# Patient Record
Sex: MALE | Race: WHITE | NOT HISPANIC OR LATINO | Employment: FULL TIME | URBAN - METROPOLITAN AREA
[De-identification: names, ages, dates, MRNs, and addresses within clinical notes are randomized per-mention and may not be internally consistent; named-entity substitution may affect disease eponyms.]

---

## 2017-03-21 ENCOUNTER — TELEPHONE (OUTPATIENT)
Dept: OTHER | Facility: CLINIC | Age: 57
End: 2017-03-21

## 2017-03-21 NOTE — TELEPHONE ENCOUNTER
3/21/2017    Call Regarding Onboarding [employer] CHOICES    Attempt 1    Message on voicemail     Comments:           Outreach   Aimee Bearden

## 2017-05-31 NOTE — TELEPHONE ENCOUNTER
5/31/2017    Call Regarding Onboarding [employer] Cho[employer]    Attempt 2    Message on voicemail     Comments:       Outreach   KV

## 2017-06-07 NOTE — TELEPHONE ENCOUNTER
6/7/2017    Call Regarding Onboarding [employer] Choices    Attempt 3    Message on voicemail     Comments: 0 dep      Outreach   CC

## 2017-07-13 ENCOUNTER — MYC MEDICAL ADVICE (OUTPATIENT)
Dept: INTERNAL MEDICINE | Facility: CLINIC | Age: 57
End: 2017-07-13

## 2017-08-02 DIAGNOSIS — K21.9 ESOPHAGEAL REFLUX: ICD-10-CM

## 2017-08-02 DIAGNOSIS — F41.9 ANXIETY: ICD-10-CM

## 2017-08-02 RX ORDER — PAROXETINE 20 MG/1
TABLET, FILM COATED ORAL
Qty: 30 TABLET | Refills: 0 | Status: SHIPPED | OUTPATIENT
Start: 2017-08-02 | End: 2017-08-17

## 2017-08-17 ENCOUNTER — OFFICE VISIT (OUTPATIENT)
Dept: INTERNAL MEDICINE | Facility: CLINIC | Age: 57
End: 2017-08-17
Payer: COMMERCIAL

## 2017-08-17 VITALS
DIASTOLIC BLOOD PRESSURE: 75 MMHG | OXYGEN SATURATION: 99 % | SYSTOLIC BLOOD PRESSURE: 118 MMHG | TEMPERATURE: 98.2 F | HEART RATE: 67 BPM | WEIGHT: 182 LBS | HEIGHT: 70 IN | BODY MASS INDEX: 26.05 KG/M2

## 2017-08-17 DIAGNOSIS — K21.9 GASTROESOPHAGEAL REFLUX DISEASE, ESOPHAGITIS PRESENCE NOT SPECIFIED: ICD-10-CM

## 2017-08-17 DIAGNOSIS — Z23 NEED FOR TDAP VACCINATION: ICD-10-CM

## 2017-08-17 DIAGNOSIS — Z00.01 ENCOUNTER FOR ROUTINE ADULT MEDICAL EXAM WITH ABNORMAL FINDINGS: Primary | ICD-10-CM

## 2017-08-17 DIAGNOSIS — Z12.5 SCREENING FOR PROSTATE CANCER: ICD-10-CM

## 2017-08-17 DIAGNOSIS — F41.9 ANXIETY: ICD-10-CM

## 2017-08-17 LAB
ALBUMIN SERPL-MCNC: 4.2 G/DL (ref 3.4–5)
ALP SERPL-CCNC: 43 U/L (ref 40–150)
ALT SERPL W P-5'-P-CCNC: 32 U/L (ref 0–70)
ANION GAP SERPL CALCULATED.3IONS-SCNC: 7 MMOL/L (ref 3–14)
AST SERPL W P-5'-P-CCNC: 19 U/L (ref 0–45)
BILIRUB SERPL-MCNC: 2.1 MG/DL (ref 0.2–1.3)
BUN SERPL-MCNC: 13 MG/DL (ref 7–30)
CALCIUM SERPL-MCNC: 8.9 MG/DL (ref 8.5–10.1)
CHLORIDE SERPL-SCNC: 103 MMOL/L (ref 94–109)
CHOLEST SERPL-MCNC: 128 MG/DL
CO2 SERPL-SCNC: 28 MMOL/L (ref 20–32)
CREAT SERPL-MCNC: 1.09 MG/DL (ref 0.66–1.25)
ERYTHROCYTE [DISTWIDTH] IN BLOOD BY AUTOMATED COUNT: 12.8 % (ref 10–15)
GFR SERPL CREATININE-BSD FRML MDRD: 69 ML/MIN/1.7M2
GLUCOSE SERPL-MCNC: 99 MG/DL (ref 70–99)
HCT VFR BLD AUTO: 47.2 % (ref 40–53)
HDLC SERPL-MCNC: 70 MG/DL
HGB BLD-MCNC: 16.3 G/DL (ref 13.3–17.7)
LDLC SERPL CALC-MCNC: 49 MG/DL
MCH RBC QN AUTO: 32.8 PG (ref 26.5–33)
MCHC RBC AUTO-ENTMCNC: 34.5 G/DL (ref 31.5–36.5)
MCV RBC AUTO: 95 FL (ref 78–100)
NONHDLC SERPL-MCNC: 58 MG/DL
PLATELET # BLD AUTO: 300 10E9/L (ref 150–450)
POTASSIUM SERPL-SCNC: 4.3 MMOL/L (ref 3.4–5.3)
PROT SERPL-MCNC: 6.9 G/DL (ref 6.8–8.8)
PSA SERPL-ACNC: 0.91 UG/L (ref 0–4)
RBC # BLD AUTO: 4.97 10E12/L (ref 4.4–5.9)
SODIUM SERPL-SCNC: 138 MMOL/L (ref 133–144)
TRIGL SERPL-MCNC: 43 MG/DL
WBC # BLD AUTO: 5.6 10E9/L (ref 4–11)

## 2017-08-17 PROCEDURE — 36415 COLL VENOUS BLD VENIPUNCTURE: CPT | Performed by: INTERNAL MEDICINE

## 2017-08-17 PROCEDURE — 90471 IMMUNIZATION ADMIN: CPT | Performed by: INTERNAL MEDICINE

## 2017-08-17 PROCEDURE — 99396 PREV VISIT EST AGE 40-64: CPT | Mod: 25 | Performed by: INTERNAL MEDICINE

## 2017-08-17 PROCEDURE — G0103 PSA SCREENING: HCPCS | Performed by: INTERNAL MEDICINE

## 2017-08-17 PROCEDURE — 85027 COMPLETE CBC AUTOMATED: CPT | Performed by: INTERNAL MEDICINE

## 2017-08-17 PROCEDURE — 90715 TDAP VACCINE 7 YRS/> IM: CPT | Performed by: INTERNAL MEDICINE

## 2017-08-17 PROCEDURE — 80053 COMPREHEN METABOLIC PANEL: CPT | Performed by: INTERNAL MEDICINE

## 2017-08-17 PROCEDURE — 80061 LIPID PANEL: CPT | Performed by: INTERNAL MEDICINE

## 2017-08-17 RX ORDER — PAROXETINE 20 MG/1
TABLET, FILM COATED ORAL
Qty: 90 TABLET | Refills: 3 | Status: SHIPPED | OUTPATIENT
Start: 2017-08-17 | End: 2018-10-02

## 2017-08-17 ASSESSMENT — PATIENT HEALTH QUESTIONNAIRE - PHQ9: 5. POOR APPETITE OR OVEREATING: NOT AT ALL

## 2017-08-17 ASSESSMENT — ANXIETY QUESTIONNAIRES
1. FEELING NERVOUS, ANXIOUS, OR ON EDGE: NOT AT ALL
3. WORRYING TOO MUCH ABOUT DIFFERENT THINGS: NOT AT ALL
IF YOU CHECKED OFF ANY PROBLEMS ON THIS QUESTIONNAIRE, HOW DIFFICULT HAVE THESE PROBLEMS MADE IT FOR YOU TO DO YOUR WORK, TAKE CARE OF THINGS AT HOME, OR GET ALONG WITH OTHER PEOPLE: NOT DIFFICULT AT ALL
7. FEELING AFRAID AS IF SOMETHING AWFUL MIGHT HAPPEN: NOT AT ALL
5. BEING SO RESTLESS THAT IT IS HARD TO SIT STILL: MORE THAN HALF THE DAYS
2. NOT BEING ABLE TO STOP OR CONTROL WORRYING: NOT AT ALL
GAD7 TOTAL SCORE: 2
6. BECOMING EASILY ANNOYED OR IRRITABLE: NOT AT ALL

## 2017-08-17 NOTE — NURSING NOTE
Screening Questionnaire for Adult Immunization    Are you sick today?   No   Do you have allergies to medications, food, a vaccine component or latex?   No   Have you ever had a serious reaction after receiving a vaccination?   No   Do you have a long-term health problem with heart disease, lung disease, asthma, kidney disease, metabolic disease (e.g. diabetes), anemia, or other blood disorder?   No   Do you have cancer, leukemia, HIV/AIDS, or any other immune system problem?   No   In the past 3 months, have you taken medications that affect  your immune system, such as prednisone, other steroids, or anticancer drugs; drugs for the treatment of rheumatoid arthritis, Crohn s disease, or psoriasis; or have you had radiation treatments?   No   Have you had a seizure, or a brain or other nervous system problem?   No   During the past year, have you received a transfusion of blood or blood     products, or been given immune (gamma) globulin or antiviral drug?   No   For women: Are you pregnant or is there a chance you could become        pregnant during the next month?   No   Have you received any vaccinations in the past 4 weeks?   No     Immunization questionnaire answers were all negative.        Per orders of Dr. Boykin, injection of Tdap given by Kelly Machado. Patient instructed to remain in clinic for 15 minutes afterwards, and to report any adverse reaction to me immediately.       Screening performed by Kelly Machado on 8/17/2017 at 2:57 PM.

## 2017-08-17 NOTE — NURSING NOTE
"Chief Complaint   Patient presents with     Physical       Initial /75  Pulse 67  Temp 98.2  F (36.8  C) (Oral)  Ht 5' 10\" (1.778 m)  Wt 182 lb (82.6 kg)  SpO2 99%  BMI 26.11 kg/m2 Estimated body mass index is 26.11 kg/(m^2) as calculated from the following:    Height as of this encounter: 5' 10\" (1.778 m).    Weight as of this encounter: 182 lb (82.6 kg).  Medication Reconciliation: complete  "

## 2017-08-17 NOTE — MR AVS SNAPSHOT
After Visit Summary   8/17/2017    Luis F Gipson    MRN: [id number]           Patient Information     Date Of Birth          1960        Visit Information        Provider Department      8/17/2017 1:00 PM Ronald Boykin MD [facility]        Today's Diagnoses     Encounter for routine adult medical exam with abnormal findings    -  1    Need for Tdap vaccination        Gastroesophageal reflux disease, esophagitis presence not specified        Anxiety        Screening for prostate cancer          Care Instructions      Preventive Health Recommendations  Male Ages 50 - 64    Yearly exam:             See your health care provider every year in order to  o   Review health changes.   o   Discuss preventive care.    o   Review your medicines if your doctor has prescribed any.     Have a cholesterol test every 5 years, or more frequently if you are at risk for high cholesterol/heart disease.     Have a diabetes test (fasting glucose) every three years. If you are at risk for diabetes, you should have this test more often.     Have a colonoscopy at age 50, or have a yearly FIT test (stool test). These exams will check for colon cancer.      Talk with your health care provider about whether or not a prostate cancer screening test (PSA) is right for you.    You should be tested each year for STDs (sexually transmitted diseases), if you re at risk.     Shots: Get a flu shot each year. Get a tetanus shot every 10 years.     Nutrition:    Eat at least 5 servings of fruits and vegetables daily.     Eat whole-grain bread, whole-wheat pasta and brown rice instead of white grains and rice.     Talk to your provider about Calcium and Vitamin D.     Lifestyle    Exercise for at least 150 minutes a week (30 minutes a day, 5 days a week). This will help you control your weight and prevent disease.     Limit alcohol to one drink per day.     No smoking.     Wear sunscreen to prevent skin  "cancer.     See your dentist every six months for an exam and cleaning.     See your eye doctor every 1 to 2 years.            Follow-ups after your visit        Who to contact     If you have questions or need follow up information about today's clinic visit or your schedule please contact [facility] directly at [phone].  Normal or non-critical lab and imaging results will be communicated to you by MyChart, letter or phone within 4 business days after the clinic has received the results. If you do not hear from us within 7 days, please contact the clinic through [employer] or phone. If you have a critical or abnormal lab result, we will notify you by phone as soon as possible.  Submit refill requests through [employer] or call your pharmacy and they will forward the refill request to us. Please allow 3 business days for your refill to be completed.          Additional Information About Your Visit        [employer] Information     [employer] gives you secure access to your electronic health record. If you see a primary care provider, you can also send messages to your care team and make appointments. If you have questions, please call your primary care clinic.  If you do not have a primary care provider, please call [phone] and they will assist you.        Care EveryWhere ID     This is your Care EveryWhere ID. This could be used by other organizations to access your [place] medical records  [id number]        Your Vitals Were     Pulse Temperature Height Pulse Oximetry BMI (Body Mass Index)       67 98.2  F (36.8  C) (Oral) 5' 10\" (1.778 m) 99% 26.11 kg/m2        Blood Pressure from Last 3 Encounters:   08/17/17 118/75   08/12/16 130/60   08/11/15 112/76    Weight from Last 3 Encounters:   08/17/17 182 lb (82.6 kg)   08/12/16 184 lb 12.8 oz (83.8 kg)   08/11/15 182 lb (82.6 kg)              We Performed the Following     CBC with platelets     Comprehensive metabolic panel     Lipid panel " reflex to direct LDL     Prostate spec antigen screen     TDAP VACCINE (ADACEL)          Today's Medication Changes          These changes are accurate as of: 8/17/17  9:45 PM.  If you have any questions, ask your nurse or doctor.               These medicines have changed or have updated prescriptions.        Dose/Directions    omeprazole 20 MG CR capsule   Commonly known as:  priLOSEC   This may have changed:  See the new instructions.   Used for:  Gastroesophageal reflux disease, esophagitis presence not specified   Changed by:  Ronald Boykin MD        TAKE 1 CAPSULE (20 MG) BY MOUTH DAILY   Quantity:  90 capsule   Refills:  3       PARoxetine 20 MG tablet   Commonly known as:  PAXIL   This may have changed:  See the new instructions.   Used for:  Anxiety   Changed by:  Ronald Boykin MD        TAKE 1 TABLET (20 MG) BY MOUTH AT BEDTIME   Quantity:  90 tablet   Refills:  3         Stop taking these medicines if you haven't already. Please contact your care team if you have questions.     LORazepam 0.5 MG tablet   Commonly known as:  ATIVAN   Stopped by:  Ronald Boykin MD                Where to get your medicines      These medications were sent to [facility] [id number] [place] [facility] [address]  [address] [place] [address]     Phone:  [phone]     omeprazole 20 MG CR capsule    PARoxetine 20 MG tablet                Primary Care Provider Office Phone # Fax #    Ronald Boykin [phone]       [address] [facility] [address]        Equal Access to Services     [facility] Hospital[facility]NA[facility]EL AH: Hadii jelly price hadasho Soeyalali, waaxda luqadaha, qaybta kaalmada adeegyada, anitha ozuna. So [facility] [phone].    ATENCIÓN: Si habla español, tiene a vu disposición servicios gratuitos de asistencia lingüística. Llame al [phone].    We comply with applicable federal civil rights laws and [place] laws. We do not discriminate on the basis of race, color, national  origin, age, disability sex, sexual orientation or gender identity.            Thank you!     Thank you for choosing [facility]  for your care. Our goal is always to provide you with excellent care. Hearing back from our patients is one way we can continue to improve our services. Please take a few minutes to complete the written survey that you may receive in the mail after your visit with us. Thank you!             Your Updated Medication List - Protect others around you: Learn how to safely use, store and throw away your medicines at www.disposemymeds.org.          This list is accurate as of: 8/17/17  9:45 PM.  Always use your most recent med list.                   Brand Name Dispense Instructions for use Diagnosis    omeprazole 20 MG CR capsule    priLOSEC    90 capsule    TAKE 1 CAPSULE (20 MG) BY MOUTH DAILY    Gastroesophageal reflux disease, esophagitis presence not specified       PARoxetine 20 MG tablet    PAXIL    90 tablet    TAKE 1 TABLET (20 MG) BY MOUTH AT BEDTIME    Anxiety

## 2017-08-17 NOTE — PROGRESS NOTES
SUBJECTIVE:   CC: Luis F Gipson is an 57 year old male who presents for preventative health visit.     Healthy Habits:    Do you get at least three servings of calcium containing foods daily (dairy, green leafy vegetables, etc.)? yes    Amount of exercise or daily activities, outside of work: 5 day(s) per week    Problems taking medications regularly No    Medication side effects: No    Have you had an eye exam in the past two years? no    Do you see a dentist twice per year? no    Do you have sleep apnea, excessive snoring or daytime drowsiness?no              Today's PHQ-2 Score: PHQ-2 ( 1999 Pfizer) 8/17/2017 8/12/2016   Q1: Little interest or pleasure in doing things 0 0   Q2: Feeling down, depressed or hopeless 0 0   PHQ-2 Score 0 0         Abuse: Current or Past(Physical, Sexual or Emotional)- No  Do you feel safe in your environment - Yes  Social History   Substance Use Topics     Smoking status: Former Smoker     Types: Cigarettes     Quit date: 3/27/2009     Smokeless tobacco: Never Used      Comment: 2-3 daily     Alcohol use Yes      Comment: couple drinks 4 days/wekk     The patient does not drink >3 drinks per day nor >7 drinks per week.    Last PSA:   PSA   Date Value Ref Range Status   08/11/2015 1.18 0 - 4 ug/L Final       Reviewed orders with patient. Reviewed health maintenance and updated orders accordingly - Yes  Labs reviewed in EPIC    Reviewed and updated as needed this visit by clinical staff  Tobacco  Allergies         Reviewed and updated as needed this visit by Provider            ROS:  C: NEGATIVE for fever, chills, change in weight  I: NEGATIVE for worrisome rashes, moles or lesions  E: NEGATIVE for vision changes or irritation. Due  For  Eye exam  ENT: NEGATIVE for ear, mouth and throat problems  R: NEGATIVE for significant cough or SOB  CV: NEGATIVE for chest pain, palpitations or peripheral edema  GI: NEGATIVE for nausea, abdominal pain, heartburn (with PPI. Has flares off  "of med), or change in bowel habits   male: negative for dysuria, hematuria, decreased urinary stream, erectile dysfunction, urethral discharge  M: NEGATIVE for significant arthralgias or myalgia  N: NEGATIVE for weakness, dizziness or paresthesias  P: NEGATIVE for changes in mood or affect with meds. ANGELICA = 2 with Paxil    OBJECTIVE:   /75  Pulse 67  Temp 98.2  F (36.8  C) (Oral)  Ht 5' 10\" (1.778 m)  Wt 182 lb (82.6 kg)  SpO2 99%  BMI 26.11 kg/m2  EXAM:  General appearance - healthy, alert, no distress. Normal affect  Skin - No rashes or lesions.  Head - normocephalic, atraumatic  Eyes - VIVEK, EOMI, fundi exam with nondilated pupils negative.  Ears - External ears normal. Canals clear. TM's normal.  Nose/Sinuses - Nares normal. Septum midline. Mucosa normal. No drainage or sinus tenderness.  Oropharynx - No erythema, no adenopathy, no exudates.  Neck - Supple without adenopathy or thyromegaly. No bruits.  Lungs - Clear to auscultation without wheezes/rhonchi.  Heart - Regular rate and rhythm without murmurs, clicks, or gallops.  Nodes - No supraclavicular, axillary, or inguinal adenopathy palpable.  Abdomen - Abdomen soft, non-tender. BS normal. No masses or hepatosplenomegaly palpable. No bruits.  Extremities -No cyanosis, clubbing or edema.    Musculoskeletal - Spine ROM normal. Muscular strength intact.   Peripheral pulses - radial=4/4, femoral=4/4, posterior tibial=4/4, dorsalis pedis=4/4,  Neuro - Gait normal. Reflexes normal and symmetric. Sensation grossly WNL.  Genital - Normal-appearing male external genitalia. No scrotal masses or inguinal hernia palpable.   Rectal - Guaic negative stool. Normal tone. Prostate normal in size to palpation. No rectal masses or prostate nodularity palpable      ASSESSMENT/PLAN:   1. Encounter for routine adult medical exam with abnormal findings  Counselled re: diet/exercise, stress reduction techniques  - Comprehensive metabolic panel  - Lipid panel reflex to " "direct LDL  - CBC with platelets  - Prostate spec antigen screen    2. Need for Tdap vaccination  - TDAP VACCINE (ADACEL)    3. Gastroesophageal reflux disease, esophagitis presence not specified  Controlled. Has flares off of med  - omeprazole (PRILOSEC) 20 MG CR capsule; TAKE 1 CAPSULE (20 MG) BY MOUTH DAILY  Dispense: 90 capsule; Refill: 3    4. Anxiety  controlled  - PARoxetine (PAXIL) 20 MG tablet; TAKE 1 TABLET (20 MG) BY MOUTH AT BEDTIME  Dispense: 90 tablet; Refill: 3    5. Screening for prostate cancer   PSA ordered for intermittent monitoring after discussion with pt and pt preference. Not doing annual PSA per USPTF recs  - Prostate spec antigen screen      COUNSELING:  Reviewed preventive health counseling, as reflected in patient instructions         reports that he quit smoking about 8 years ago. His smoking use included Cigarettes. He has never used smokeless tobacco.      Estimated body mass index is 26.11 kg/(m^2) as calculated from the following:    Height as of this encounter: 5' 10\" (1.778 m).    Weight as of this encounter: 182 lb (82.6 kg).   Weight management plan: Discussed healthy diet and exercise guidelines and patient will follow up in 12 months in clinic to re-evaluate.    Counseling Resources:  ATP IV Guidelines  Pooled Cohorts Equation Calculator  FRAX Risk Assessment  ICSI Preventive Guidelines  Dietary Guidelines for Americans, 2010  USDA's MyPlate  ASA Prophylaxis  Lung CA Screening    Ronald Boykin MD  [facility]    "

## 2017-08-18 ASSESSMENT — ANXIETY QUESTIONNAIRES: GAD7 TOTAL SCORE: 2

## 2017-09-03 DIAGNOSIS — F41.9 ANXIETY: ICD-10-CM

## 2017-09-03 NOTE — TELEPHONE ENCOUNTER
PARoxetine (PAXIL) 20 MG tablet     Last Written Prescription Date: 8/17/17  Last Fill Quantity: 90, # refills: 3  Last Office Visit with [facility] primary care provider:  8/17/17        Last PHQ-9 score on record= No flowsheet data found.

## 2017-09-06 RX ORDER — PAROXETINE 20 MG/1
TABLET, FILM COATED ORAL
Qty: 30 TABLET | Refills: 10 | Status: SHIPPED | OUTPATIENT
Start: 2017-09-06 | End: 2018-10-02 | Stop reason: DRUGHIGH

## 2017-12-18 ENCOUNTER — MYC MEDICAL ADVICE (OUTPATIENT)
Dept: INTERNAL MEDICINE | Facility: CLINIC | Age: 57
End: 2017-12-18

## 2018-02-05 ENCOUNTER — MYC MEDICAL ADVICE (OUTPATIENT)
Dept: INTERNAL MEDICINE | Facility: CLINIC | Age: 58
End: 2018-02-05

## 2018-08-07 DIAGNOSIS — K21.9 GASTROESOPHAGEAL REFLUX DISEASE, ESOPHAGITIS PRESENCE NOT SPECIFIED: ICD-10-CM

## 2018-08-07 NOTE — TELEPHONE ENCOUNTER
"Requested Prescriptions   Pending Prescriptions Disp Refills     omeprazole (PRILOSEC) 20 MG CR capsule [Pharmacy Med Name: OMEPRAZOLE DR 20 MG CAPSULE] 90 capsule 3     Sig: TAKE 1 CAPSULE (20 MG) BY MOUTH DAILY    PPI Protocol Passed    8/7/2018  1:49 AM       Passed - Not on Clopidogrel (unless Pantoprazole ordered)       Passed - No diagnosis of osteoporosis on record       Passed - Recent (12 mo) or future (30 days) visit within the authorizing provider's specialty    Patient had office visit in the last 12 months or has a visit in the next 30 days with authorizing provider or within the authorizing provider's specialty.  See \"Patient Info\" tab in inbasket, or \"Choose Columns\" in Meds & Orders section of the refill encounter.           Passed - Patient is age 18 or older        Last Written Prescription Date:  8/17/2017  Last Fill Quantity: 90,  # refills: 3   Last office visit: 8/17/2017 with prescribing provider:  8/17/2017   Future Office Visit:       "

## 2018-08-07 NOTE — LETTER
[facility]  [address] [id number] [phone]            Luis F Gipson  [address] [place], [place] [address] [address]        August 7, 2018    Dear Luis F,    While refilling your prescription today, we noticed that you are due for an appointment with your provider.  We will refill your prescription for 30 days, but a follow-up appointment must be made before any additional refills can be approved.     Taking care of your health is important to us and we look forward to seeing you in the near future.  Please call us at [phone] or [phone] (or use [employer]) to schedule an appointment.     Please disregard this notice if you have already made an appointment.    Sincerely,        [facility]

## 2018-09-07 DIAGNOSIS — K21.9 GASTROESOPHAGEAL REFLUX DISEASE, ESOPHAGITIS PRESENCE NOT SPECIFIED: ICD-10-CM

## 2018-09-07 NOTE — TELEPHONE ENCOUNTER
"Requested Prescriptions   Pending Prescriptions Disp Refills     omeprazole (PRILOSEC) 20 MG CR capsule [Pharmacy Med Name: OMEPRAZOLE DR 20 MG CAPSULE] 30 capsule 0    Last Written Prescription Date:  8/7/2018  Last Fill Quantity: 30,  # refills: 0   Last office visit: 8/17/2017 with prescribing provider:  8/17/2017   Future Office Visit:     Sig: TAKE 1 CAPSULE BY MOUTH EVERY DAY    PPI Protocol Failed    9/7/2018  4:09 AM       Failed - Recent (12 mo) or future (30 days) visit within the authorizing provider's specialty    Patient had office visit in the last 12 months or has a visit in the next 30 days with authorizing provider or within the authorizing provider's specialty.  See \"Patient Info\" tab in inbasket, or \"Choose Columns\" in Meds & Orders section of the refill encounter.           Passed - Not on Clopidogrel (unless Pantoprazole ordered)       Passed - No diagnosis of osteoporosis on record       Passed - Patient is age 18 or older          "

## 2018-09-11 NOTE — TELEPHONE ENCOUNTER
Routing refill request to provider for review/approval because:  Annie given x1 and patient did not follow up, please advise  Patient needs to be seen because it has been more than 1 year since last office visit.

## 2018-09-11 NOTE — TELEPHONE ENCOUNTER
Medication refilled for 30 days.  Specifically call the patient and instruct him that he needs an appointment in the next 30 days in clinic with me for follow-up.  Will address future refills after patient seen. Assist patient in scheduling an appointment.

## 2018-10-02 ENCOUNTER — OFFICE VISIT (OUTPATIENT)
Dept: INTERNAL MEDICINE | Facility: CLINIC | Age: 58
End: 2018-10-02
Payer: COMMERCIAL

## 2018-10-02 VITALS
RESPIRATION RATE: 16 BRPM | HEIGHT: 70 IN | TEMPERATURE: 98.4 F | DIASTOLIC BLOOD PRESSURE: 70 MMHG | BODY MASS INDEX: 26.34 KG/M2 | HEART RATE: 80 BPM | OXYGEN SATURATION: 97 % | SYSTOLIC BLOOD PRESSURE: 120 MMHG | WEIGHT: 184 LBS

## 2018-10-02 DIAGNOSIS — Z23 NEED FOR SHINGLES VACCINE: ICD-10-CM

## 2018-10-02 DIAGNOSIS — F41.9 ANXIETY: ICD-10-CM

## 2018-10-02 DIAGNOSIS — K21.9 GASTROESOPHAGEAL REFLUX DISEASE, ESOPHAGITIS PRESENCE NOT SPECIFIED: ICD-10-CM

## 2018-10-02 DIAGNOSIS — Z00.00 ENCOUNTER FOR ROUTINE ADULT HEALTH EXAMINATION WITHOUT ABNORMAL FINDINGS: Primary | ICD-10-CM

## 2018-10-02 LAB
ALBUMIN SERPL-MCNC: 3.8 G/DL (ref 3.4–5)
ALP SERPL-CCNC: 52 U/L (ref 40–150)
ALT SERPL W P-5'-P-CCNC: 63 U/L (ref 0–70)
ANION GAP SERPL CALCULATED.3IONS-SCNC: 7 MMOL/L (ref 3–14)
AST SERPL W P-5'-P-CCNC: 28 U/L (ref 0–45)
BILIRUB SERPL-MCNC: 1.6 MG/DL (ref 0.2–1.3)
BUN SERPL-MCNC: 11 MG/DL (ref 7–30)
CALCIUM SERPL-MCNC: 8.3 MG/DL (ref 8.5–10.1)
CHLORIDE SERPL-SCNC: 100 MMOL/L (ref 94–109)
CO2 SERPL-SCNC: 28 MMOL/L (ref 20–32)
CREAT SERPL-MCNC: 1.06 MG/DL (ref 0.66–1.25)
ERYTHROCYTE [DISTWIDTH] IN BLOOD BY AUTOMATED COUNT: 12.8 % (ref 10–15)
GFR SERPL CREATININE-BSD FRML MDRD: 71 ML/MIN/1.7M2
GLUCOSE SERPL-MCNC: 102 MG/DL (ref 70–99)
HCT VFR BLD AUTO: 45.6 % (ref 40–53)
HGB BLD-MCNC: 15.7 G/DL (ref 13.3–17.7)
MCH RBC QN AUTO: 32.8 PG (ref 26.5–33)
MCHC RBC AUTO-ENTMCNC: 34.4 G/DL (ref 31.5–36.5)
MCV RBC AUTO: 95 FL (ref 78–100)
PLATELET # BLD AUTO: 331 10E9/L (ref 150–450)
POTASSIUM SERPL-SCNC: 4 MMOL/L (ref 3.4–5.3)
PROT SERPL-MCNC: 6.7 G/DL (ref 6.8–8.8)
RBC # BLD AUTO: 4.78 10E12/L (ref 4.4–5.9)
SODIUM SERPL-SCNC: 135 MMOL/L (ref 133–144)
WBC # BLD AUTO: 6.5 10E9/L (ref 4–11)

## 2018-10-02 PROCEDURE — 90471 IMMUNIZATION ADMIN: CPT | Performed by: INTERNAL MEDICINE

## 2018-10-02 PROCEDURE — 80053 COMPREHEN METABOLIC PANEL: CPT | Performed by: INTERNAL MEDICINE

## 2018-10-02 PROCEDURE — 90750 HZV VACC RECOMBINANT IM: CPT | Performed by: INTERNAL MEDICINE

## 2018-10-02 PROCEDURE — 36415 COLL VENOUS BLD VENIPUNCTURE: CPT | Performed by: INTERNAL MEDICINE

## 2018-10-02 PROCEDURE — 99396 PREV VISIT EST AGE 40-64: CPT | Mod: 25 | Performed by: INTERNAL MEDICINE

## 2018-10-02 PROCEDURE — 85027 COMPLETE CBC AUTOMATED: CPT | Performed by: INTERNAL MEDICINE

## 2018-10-02 RX ORDER — PAROXETINE 10 MG/1
10 TABLET, FILM COATED ORAL AT BEDTIME
Qty: 90 TABLET | Refills: 3 | Status: SHIPPED | OUTPATIENT
Start: 2018-10-02 | End: 2020-01-07

## 2018-10-02 ASSESSMENT — ANXIETY QUESTIONNAIRES
3. WORRYING TOO MUCH ABOUT DIFFERENT THINGS: NOT AT ALL
IF YOU CHECKED OFF ANY PROBLEMS ON THIS QUESTIONNAIRE, HOW DIFFICULT HAVE THESE PROBLEMS MADE IT FOR YOU TO DO YOUR WORK, TAKE CARE OF THINGS AT HOME, OR GET ALONG WITH OTHER PEOPLE: NOT DIFFICULT AT ALL
5. BEING SO RESTLESS THAT IT IS HARD TO SIT STILL: NOT AT ALL
7. FEELING AFRAID AS IF SOMETHING AWFUL MIGHT HAPPEN: NOT AT ALL
6. BECOMING EASILY ANNOYED OR IRRITABLE: NOT AT ALL
2. NOT BEING ABLE TO STOP OR CONTROL WORRYING: NOT AT ALL
1. FEELING NERVOUS, ANXIOUS, OR ON EDGE: NOT AT ALL
GAD7 TOTAL SCORE: 0

## 2018-10-02 ASSESSMENT — PATIENT HEALTH QUESTIONNAIRE - PHQ9: 5. POOR APPETITE OR OVEREATING: NOT AT ALL

## 2018-10-02 NOTE — MR AVS SNAPSHOT
After Visit Summary   10/2/2018    Luis F Gipson    MRN: [id number]           Patient Information     Date Of Birth          1960        Visit Information        Provider Department      10/2/2018 1:30 PM Ronald Boykin MD [facility]        Today's Diagnoses     Need for prophylactic vaccination and inoculation against influenza    -  1    Gastroesophageal reflux disease, esophagitis presence not specified        Anxiety        Encounter for routine adult health examination without abnormal findings          Care Instructions    Continue current meds  Prescriptions refilled.    Labs today as ordered  Shingrx vaccine for shingles prevention. Repeat 2nd vaccine 2-6 months later here or pharmacy. If at pharmacy, then send me update note in [employer]                    Follow-ups after your visit        Who to contact     If you have questions or need follow up information about today's clinic visit or your schedule please contact [facility] directly at [phone].  Normal or non-critical lab and imaging results will be communicated to you by Utilize Healthhart, letter or phone within 4 business days after the clinic has received the results. If you do not hear from us within 7 days, please contact the clinic through [facility] [employer] or phone. If you have a critical or abnormal lab result, we will notify you by phone as soon as possible.  Submit refill requests through [employer] or call your pharmacy and they will forward the refill request to us. Please allow 3 business days for your refill to be completed.          Additional Information About Your Visit        MyChart Information     Movea gives you secure access to your electronic health record. If you see a primary care provider, you can also send messages to your care team and make appointments. If you have questions, please call your primary care clinic.  If you do not have a primary care provider, please call  "[phone] and they will assist you.        Care EveryWhere ID     This is your Care EveryWhere ID. This could be used by other organizations to access your [place] medical records  [id number]        Your Vitals Were     Pulse Temperature Respirations Height Pulse Oximetry BMI (Body Mass Index)    80 98.4  F (36.9  C) (Oral) 16 5' 10\" (1.778 m) 97% 26.4 kg/m2       Blood Pressure from Last 3 Encounters:   10/02/18 120/70   08/17/17 118/75   08/12/16 130/60    Weight from Last 3 Encounters:   10/02/18 184 lb (83.5 kg)   08/17/17 182 lb (82.6 kg)   08/12/16 184 lb 12.8 oz (83.8 kg)              We Performed the Following     CBC with platelets     Comprehensive metabolic panel          Today's Medication Changes          These changes are accurate as of 10/2/18  2:18 PM.  If you have any questions, ask your nurse or doctor.               These medicines have changed or have updated prescriptions.        Dose/Directions    * PARoxetine 20 MG tablet   Commonly known as:  PAXIL   This may have changed:  Another medication with the same name was added. Make sure you understand how and when to take each.   Used for:  Anxiety   Changed by:  Ronald Boykin MD        TAKE 1 TABLET (20 MG) BY MOUTH AT BEDTIME. NEEDS APPT FOR FURTHER REFILLS.   Quantity:  30 tablet   Refills:  10       * PARoxetine 10 MG tablet   Commonly known as:  PAXIL   This may have changed:  You were already taking a medication with the same name, and this prescription was added. Make sure you understand how and when to take each.   Used for:  Anxiety   Changed by:  Ronald Boykin MD        Dose:  10 mg   Take 1 tablet (10 mg) by mouth At Bedtime   Quantity:  90 tablet   Refills:  3       * Notice:  This list has 2 medication(s) that are the same as other medications prescribed for you. Read the directions carefully, and ask your doctor or other care provider to review them with you.         Where to get your medicines      These medications were sent " to [facility] [id number] [facility] - [place], [place] - [address]  [address], [facility] [address]     Phone:  [phone]     omeprazole 20 MG CR capsule    PARoxetine 10 MG tablet                Primary Care Provider Office Phone # Fax #    Ronald Boykin [phone]       [address] [facility] [address]        Equal Access to Services     DIANNE COCHRAN : Hadii aad ku hadasho Soomaali, waaxda luqadaha, qaybta kaalmada adeegyada, waxay idiin hayaan adeeg kharash la'sameer . So [facility] [phone].    ATENCIÓN: Si habla español, tiene a vu disposición servicios gratuitos de asistencia lingüística. Llame al [phone].    We comply with applicable federal civil rights laws and [place] laws. We do not discriminate on the basis of race, color, national origin, age, disability, sex, sexual orientation, or gender identity.            Thank you!     Thank you for choosing [facility]  for your care. Our goal is always to provide you with excellent care. Hearing back from our patients is one way we can continue to improve our services. Please take a few minutes to complete the written survey that you may receive in the mail after your visit with us. Thank you!             Your Updated Medication List - Protect others around you: Learn how to safely use, store and throw away your medicines at www.disposemymeds.org.          This list is accurate as of 10/2/18  2:18 PM.  Always use your most recent med list.                   Brand Name Dispense Instructions for use Diagnosis    omeprazole 20 MG CR capsule    priLOSEC    90 capsule    TAKE 1 CAPSULE BY MOUTH EVERY DAY    Gastroesophageal reflux disease, esophagitis presence not specified       * PARoxetine 20 MG tablet    PAXIL    30 tablet    TAKE 1 TABLET (20 MG) BY MOUTH AT BEDTIME. NEEDS APPT FOR FURTHER REFILLS.    Anxiety       * PARoxetine 10 MG tablet    PAXIL    90 tablet    Take 1 tablet (10 mg) by mouth At Bedtime     Anxiety       * Notice:  This list has 2 medication(s) that are the same as other medications prescribed for you. Read the directions carefully, and ask your doctor or other care provider to review them with you.

## 2018-10-02 NOTE — PROGRESS NOTES
SUBJECTIVE:   CC: Luis F Gipson is an 58 year old male who presents for preventative health visit.     Healthy Habits:  Answers for HPI/ROS submitted by the patient on 10/2/2018   Annual Exam:  Getting at least 3 servings of Calcium per day:: Yes  Bi-annual eye exam:: NO  Dental care twice a year:: NO  Sleep apnea or symptoms of sleep apnea:: None  Diet:: Regular (no restrictions)  Frequency of exercise:: 4-5 days/week  Taking medications regularly:: Yes  Medication side effects:: None  Additional concerns today:: No  PHQ-2 Score: 0  Duration of exercise:: 30-45 minutes  Question 1.  In the last 12 months: We worried food would run out before we had money to buy more. Never True    Question 2.  In the last 12 months: The food we bought just didn't last and we didn't have money to buy more. Never True    Did the patient answer Sometimes True or Often True to EITHER Question 1 or Question 2? No            Today's PHQ-2 Score:   PHQ-2 ( 1999 Pfizer) 8/17/2017 8/12/2016   Q1: Little interest or pleasure in doing things 0 0   Q2: Feeling down, depressed or hopeless 0 0   PHQ-2 Score 0 0       Abuse: Current or Past(Physical, Sexual or Emotional)- No  Do you feel safe in your environment - Yes    Social History   Substance Use Topics     Smoking status: Former Smoker     Types: Cigarettes     Quit date: 3/27/2009     Smokeless tobacco: Never Used      Comment: 2-3 daily     Alcohol use Yes      Comment: couple drinks 4 days/wekk      If you drink alcohol do you typically have >3 drinks per day or >7 drinks per week? No                      Last PSA:   PSA   Date Value Ref Range Status   08/17/2017 0.91 0 - 4 ug/L Final     Comment:     Assay Method:  Chemiluminescence using Siemens Vista analyzer       Reviewed orders with patient. Reviewed health maintenance and updated orders accordingly - Yes  Labs reviewed in EPIC    Reviewed and updated as needed this visit by clinical staff         Reviewed and updated as  "needed this visit by Provider            ROS:  CONSTITUTIONAL: NEGATIVE for fever, chills, change in weight  INTEGUMENTARY/SKIN: NEGATIVE for worrisome rashes, moles or lesions  EYES: NEGATIVE for vision changes or irritation  ENT: NEGATIVE for ear, mouth and throat problems  RESP: NEGATIVE for significant cough or SOB  CV: NEGATIVE for chest pain, palpitations or peripheral edema  GI: NEGATIVE for nausea, abdominal pain, heartburn (as long as takes PPI), or change in bowel habits   male: negative for dysuria, hematuria, decreased urinary stream, erectile dysfunction, urethral discharge  MUSCULOSKELETAL: NEGATIVE for significant arthralgias or myalgia  NEURO: NEGATIVE for weakness, dizziness or paresthesias  PSYCHIATRIC: NEGATIVE for changes in mood or affect with meds. PHQ=0    OBJECTIVE:   /70  Pulse 80  Temp 98.4  F (36.9  C) (Oral)  Resp 16  Ht 5' 10\" (1.778 m)  Wt 184 lb (83.5 kg)  SpO2 97%  BMI 26.4 kg/m2  EXAM:  General appearance - healthy, alert, no distress. Calm affect  Skin - No rashes or lesions except for some mild thickening skin bilateral knees from frequent kneeling with work recently.  Head - normocephalic, atraumatic  Eyes - VIVEK, EOMI, fundi exam with nondilated pupils negative.  Ears - External ears normal. Canals clear. TM's normal.  Nose/Sinuses - Nares normal. Septum midline. Mucosa normal. No drainage or sinus tenderness.  Oropharynx - No erythema, no adenopathy, no exudates.  Neck - Supple without adenopathy or thyromegaly. No bruits.  Lungs - Clear to auscultation without wheezes/rhonchi.  Heart - Regular rate and rhythm without murmurs, clicks, or gallops.  Nodes - No supraclavicular, axillary, or inguinal adenopathy palpable.  Abdomen - Abdomen soft, non-tender. BS normal. No masses or hepatosplenomegaly palpable. No bruits.  Extremities -No cyanosis, clubbing or edema.    Musculoskeletal - Spine ROM normal. Muscular strength intact.   Peripheral pulses - radial=4/4, " "femoral=4/4, posterior tibial=4/4, dorsalis pedis=4/4,  Neuro - Gait normal. Reflexes normal and symmetric. Sensation grossly WNL.  Genital - Normal-appearing male external genitalia. No scrotal masses or inguinal hernia palpable.   Rectal - Guaic negative stool. Normal tone. Prostate normal in size to palpation. No rectal masses or prostate nodularity palpable        ASSESSMENT/PLAN:   1. Encounter for routine adult health examination without abnormal findings  Screening labs as ordered.  Lipids and PSA up-to-date.  Counseled regarding diet and exercise  - Comprehensive metabolic panel  - CBC with platelets    2. Gastroesophageal reflux disease, esophagitis presence not specified  Controlled.  Continue current medication.  Patient has recurrence of symptoms off of medication  - omeprazole (PRILOSEC) 20 MG CR capsule; TAKE 1 CAPSULE BY MOUTH EVERY DAY  Dispense: 90 capsule; Refill: 3    3. Anxiety  Controlled.  Continue current medication  - PARoxetine (PAXIL) 10 MG tablet; Take 1 tablet (10 mg) by mouth At Bedtime  Dispense: 90 tablet; Refill: 3    4. Need for shingles vaccine  Patient requests vaccination today even if not covered by insurance.  Will repeat in 2-6 months  - HC ZOSTER VACCINE RECOMBINANT ADJUVANTED IM NJX  - VACCINE ADMINISTRATION, INITIAL      COUNSELING:  Reviewed preventive health counseling, as reflected in patient instructions    BP Readings from Last 1 Encounters:   08/17/17 118/75     Estimated body mass index is 26.11 kg/(m^2) as calculated from the following:    Height as of 8/17/17: 5' 10\" (1.778 m).    Weight as of 8/17/17: 182 lb (82.6 kg).      Weight management plan: Discussed healthy diet and exercise guidelines and patient will follow up in 12 months in clinic to re-evaluate.     reports that he quit smoking about 9 years ago. His smoking use included Cigarettes. He has never used smokeless tobacco.      Counseling Resources:  ATP IV Guidelines  Pooled Cohorts Equation " Calculator  FRAX Risk Assessment  ICSI Preventive Guidelines  Dietary Guidelines for Americans, 2010  USDA's MyPlate  ASA Prophylaxis  Lung CA Screening    Ronald Boykin MD  [facility]

## 2018-10-02 NOTE — NURSING NOTE
Screening Questionnaire for Adult Immunization    Are you sick today?   No   Do you have allergies to medications, food, a vaccine component or latex?   No   Have you ever had a serious reaction after receiving a vaccination?   No   Do you have a long-term health problem with heart disease, lung disease, asthma, kidney disease, metabolic disease (e.g. diabetes), anemia, or other blood disorder?   No   Do you have cancer, leukemia, HIV/AIDS, or any other immune system problem?   No   In the past 3 months, have you taken medications that affect  your immune system, such as prednisone, other steroids, or anticancer drugs; drugs for the treatment of rheumatoid arthritis, Crohn s disease, or psoriasis; or have you had radiation treatments?   No   Have you had a seizure, or a brain or other nervous system problem?   No   During the past year, have you received a transfusion of blood or blood     products, or been given immune (gamma) globulin or antiviral drug?   No   For women: Are you pregnant or is there a chance you could become        pregnant during the next month?   No   Have you received any vaccinations in the past 4 weeks?   No     Immunization questionnaire answers were all negative.        Per orders of Dr. Boykin, injection of Shingrix #1 given by Kelly Machado. Patient instructed to remain in clinic for 15 minutes afterwards, and to report any adverse reaction to me immediately.       Screening performed by Kelly Machado on 10/2/2018 at 2:30 PM.

## 2018-10-02 NOTE — PATIENT INSTRUCTIONS
Continue current meds  Prescriptions refilled.    Labs today as ordered  Shingrx vaccine for shingles prevention. Repeat 2nd vaccine 2-6 months later here or pharmacy. If at pharmacy, then send me update note in [employer]

## 2018-10-03 ASSESSMENT — ANXIETY QUESTIONNAIRES: GAD7 TOTAL SCORE: 0

## 2018-10-10 ENCOUNTER — MYC MEDICAL ADVICE (OUTPATIENT)
Dept: INTERNAL MEDICINE | Facility: CLINIC | Age: 58
End: 2018-10-10

## 2018-10-10 DIAGNOSIS — E83.51 HYPOCALCEMIA: Primary | ICD-10-CM

## 2018-10-11 ENCOUNTER — MYC MEDICAL ADVICE (OUTPATIENT)
Dept: INTERNAL MEDICINE | Facility: CLINIC | Age: 58
End: 2018-10-11

## 2018-10-11 DIAGNOSIS — E83.51 HYPOCALCEMIA: Primary | ICD-10-CM

## 2018-11-08 ENCOUNTER — OFFICE VISIT (OUTPATIENT)
Dept: INTERNAL MEDICINE | Facility: CLINIC | Age: 58
End: 2018-11-08
Payer: COMMERCIAL

## 2018-11-08 VITALS
OXYGEN SATURATION: 98 % | SYSTOLIC BLOOD PRESSURE: 122 MMHG | HEART RATE: 84 BPM | TEMPERATURE: 98.1 F | BODY MASS INDEX: 26.83 KG/M2 | WEIGHT: 187 LBS | DIASTOLIC BLOOD PRESSURE: 74 MMHG | RESPIRATION RATE: 16 BRPM

## 2018-11-08 DIAGNOSIS — E83.51 HYPOCALCEMIA: ICD-10-CM

## 2018-11-08 DIAGNOSIS — R35.0 URINE FREQUENCY: ICD-10-CM

## 2018-11-08 LAB
ALBUMIN SERPL-MCNC: 4.1 G/DL (ref 3.4–5)
ALBUMIN UR-MCNC: NEGATIVE MG/DL
ALP SERPL-CCNC: 55 U/L (ref 40–150)
ALT SERPL W P-5'-P-CCNC: 38 U/L (ref 0–70)
ANION GAP SERPL CALCULATED.3IONS-SCNC: 5 MMOL/L (ref 3–14)
APPEARANCE UR: CLEAR
AST SERPL W P-5'-P-CCNC: 19 U/L (ref 0–45)
BILIRUB SERPL-MCNC: 1.3 MG/DL (ref 0.2–1.3)
BILIRUB UR QL STRIP: NEGATIVE
BUN SERPL-MCNC: 12 MG/DL (ref 7–30)
CALCIUM SERPL-MCNC: 8.6 MG/DL (ref 8.5–10.1)
CHLORIDE SERPL-SCNC: 102 MMOL/L (ref 94–109)
CO2 SERPL-SCNC: 31 MMOL/L (ref 20–32)
COLOR UR AUTO: YELLOW
CREAT SERPL-MCNC: 1.32 MG/DL (ref 0.66–1.25)
GFR SERPL CREATININE-BSD FRML MDRD: 55 ML/MIN/1.7M2
GLUCOSE SERPL-MCNC: 96 MG/DL (ref 70–99)
GLUCOSE UR STRIP-MCNC: NEGATIVE MG/DL
HGB UR QL STRIP: NEGATIVE
KETONES UR STRIP-MCNC: NEGATIVE MG/DL
LEUKOCYTE ESTERASE UR QL STRIP: NEGATIVE
MAGNESIUM SERPL-MCNC: 2.4 MG/DL (ref 1.6–2.3)
NITRATE UR QL: NEGATIVE
PH UR STRIP: 6.5 PH (ref 5–7)
POTASSIUM SERPL-SCNC: 5 MMOL/L (ref 3.4–5.3)
PROT SERPL-MCNC: 7 G/DL (ref 6.8–8.8)
PTH-INTACT SERPL-MCNC: 65 PG/ML (ref 18–80)
SODIUM SERPL-SCNC: 138 MMOL/L (ref 133–144)
SOURCE: NORMAL
SP GR UR STRIP: 1.01 (ref 1–1.03)
UROBILINOGEN UR STRIP-ACNC: 0.2 EU/DL (ref 0.2–1)

## 2018-11-08 PROCEDURE — 99213 OFFICE O/P EST LOW 20 MIN: CPT | Performed by: INTERNAL MEDICINE

## 2018-11-08 PROCEDURE — 80053 COMPREHEN METABOLIC PANEL: CPT | Performed by: INTERNAL MEDICINE

## 2018-11-08 PROCEDURE — 36415 COLL VENOUS BLD VENIPUNCTURE: CPT | Performed by: INTERNAL MEDICINE

## 2018-11-08 PROCEDURE — 82306 VITAMIN D 25 HYDROXY: CPT | Performed by: INTERNAL MEDICINE

## 2018-11-08 PROCEDURE — 81003 URINALYSIS AUTO W/O SCOPE: CPT | Performed by: INTERNAL MEDICINE

## 2018-11-08 PROCEDURE — 83970 ASSAY OF PARATHORMONE: CPT | Performed by: INTERNAL MEDICINE

## 2018-11-08 PROCEDURE — 83735 ASSAY OF MAGNESIUM: CPT | Performed by: INTERNAL MEDICINE

## 2018-11-08 NOTE — MR AVS SNAPSHOT
After Visit Summary   11/8/2018    Luis F Gipson    MRN: [id number]           Patient Information     Date Of Birth          1960        Visit Information        Provider Department      11/8/2018 4:00 PM Ronald Boykin MD [facility]        Today's Diagnoses     Urine frequency        Hypocalcemia           Follow-ups after your visit        Who to contact     If you have questions or need follow up information about today's clinic visit or your schedule please contact [facility] directly at [phone].  Normal or non-critical lab and imaging results will be communicated to you by [facility], letter or phone within 4 business days after the clinic has received the results. If you do not hear from us within 7 days, please contact the clinic through [employer] or phone. If you have a critical or abnormal lab result, we will notify you by phone as soon as possible.  Submit refill requests through Am[employer] or call your pharmacy and they will forward the refill request to us. Please allow 3 business days for your refill to be completed.          Additional Information About Your Visit        MyChart Information     Am[employer]be gives you secure access to your electronic health record. If you see a primary care provider, you can also send messages to your care team and make appointments. If you have questions, please call your primary care clinic.  If you do not have a primary care provider, please call [phone] and they will assist you.        Care EveryWhere ID     This is your Care EveryWhere ID. This could be used by other organizations to access your [place] medical records  [id number]        Your Vitals Were     Pulse Temperature Respirations Pulse Oximetry BMI (Body Mass Index)       84 98.1  F (36.7  C) (Oral) 16 98% 26.83 kg/m2        Blood Pressure from Last 3 Encounters:   11/08/18 122/74   10/02/18 120/70   08/17/17 118/75    Weight from  Last 3 Encounters:   11/08/18 187 lb (84.8 kg)   10/02/18 184 lb (83.5 kg)   08/17/17 182 lb (82.6 kg)              We Performed the Following     *UA reflex to Microscopic and Culture ([place] and [facility] (except [place] and [place])     Comprehensive metabolic panel     Magnesium     Parathyroid Hormone Intact     Vitamin D Deficiency        Primary Care Provider Office Phone # Fax #    Ronald Boykin [phone]       [address] [facility] [address]        Equal Access to Services     DIANNE COCHRAN : Hadii aad ku hadasho Soomaali, waaxda luqadaha, qaybta kaalmada adeegyada, waxay dima haysameer damon . So [facility] [phone].    ATENCIÓN: Si habla español, tiene a vu disposición servicios gratuitos de asistencia lingüística. Llame al [phone].    We comply with applicable federal civil rights laws and [place] laws. We do not discriminate on the basis of race, color, national origin, age, disability, sex, sexual orientation, or gender identity.            Thank you!     Thank you for choosing [facility]  for your care. Our goal is always to provide you with excellent care. Hearing back from our patients is one way we can continue to improve our services. Please take a few minutes to complete the written survey that you may receive in the mail after your visit with us. Thank you!             Your Updated Medication List - Protect others around you: Learn how to safely use, store and throw away your medicines at www.disposemymeds.org.          This list is accurate as of 11/8/18 10:43 PM.  Always use your most recent med list.                   Brand Name Dispense Instructions for use Diagnosis    omeprazole 20 MG CR capsule    priLOSEC    90 capsule    TAKE 1 CAPSULE BY MOUTH EVERY DAY    Gastroesophageal reflux disease, esophagitis presence not specified       PARoxetine 10 MG tablet    PAXIL    90 tablet    Take 1 tablet (10 mg) by mouth At  Bedtime    Anxiety

## 2018-11-09 LAB — DEPRECATED CALCIDIOL+CALCIFEROL SERPL-MC: 46 UG/L (ref 20–75)

## 2018-11-09 NOTE — PROGRESS NOTES
"  SUBJECTIVE:   Luis F Gipson is a 58 year old male who presents to clinic today for the following health issues:    Chief Complaint   Patient presents with     Urinary Problem       Genitourinary - Male  Onset: For some time, has gotten worse in the last few weeks    Description:   Dysuria (painful urination): no   Odor: no  Hematuria (blood in urine): no   Frequency: YES  Are you urinating at night : no   Hesitancy (delay in urine): no   Retention (unable to empty): no   Decrease in urinary flow: no   Incontinence: no     Progression of Symptoms:  worsening    Accompanying Signs & Symptoms:  Fever: no   Back/Flank pain: no   Urethral discharge: no   Testicle lumps/masses/pain: no   Nausea and/or vomiting: no   Abdominal pain: no     History:   History of frequent UTI's: no   History of kidney stones: no   History of hernias: YES- as a child  Personal or Family history of Prostate problems: YES, father had prostate cancer. Pt had normal PSA 2017  Sexually active: no     Precipitating factors:   None     Alleviating factors:  none    Pt's past medical history, family history, habits, medications and allergies were reviewed with the patient today.  See snap shot for  HCM status. Most recent lab results reviewed with pt. Problem list and histories reviewed & adjusted, as indicated.  Additional history as below:     4 hot chocolates this AM   Drinks a lot of water  In the day to prevent headache and works well. Headache will be vision changes with lightning bolts, etc   Has sense of urgency of urination often.    Sx come and go. Can sometimes distract self with work and then will not have to urinate for hours  Was bed wetter in childhood with \"small bladder\"  3 beers at night  Also due for labs re: mild low protein and calcium     Additional ROS:   Constitutional, HEENT, Cardiovascular, Pulmonary, GI and , Neuro, MSK and Psych review of systems/symptoms are otherwise negative or unchanged from previous, except " "as noted above.      OBJECTIVE:  /74  Pulse 84  Temp 98.1  F (36.7  C) (Oral)  Resp 16  Wt 187 lb (84.8 kg)  SpO2 98%  BMI 26.83 kg/m2   Estimated body mass index is 26.83 kg/(m^2) as calculated from the following:    Height as of 10/2/18: 5' 10\" (1.778 m).    Weight as of this encounter: 187 lb (84.8 kg).    Pulm: Lungs clear to auscultation   CV: Regular rates and rhythm  GI: Soft, nontender, Normal active bowel sounds, No hepatosplenomegaly or masses palpable  Ext: Peripheral pulses intact. No edema.  Neuro: Normal strength and tone, sensory exam grossly normal  Rectal: prostate symmetric w/o nodularity, no masses palpated, stool guaiac negative and prostate 1+ and nontender to palpation    Assessment/Plan: (See plan discussion below for further details)    1. Urine frequency  Likely related to caffeine/beer intake and amount fluids consumed. Pt will wean off caffeine in AM over the next week and try putting beer aside to see if then less dehydrating and if then needs to drink less water. UA done and negative. Normal SG on UA so doubt DI. No strain. If sx not improving with above, pt will contact MD and will refer to Urology to consider urodynamic studies to assess bladder size/etc given prior urinary history before any  trial meds like Detrol, etc  - *UA reflex to Microscopic and Culture ([place] and [facility] (except [place] and [place])    2. Hypocalcemia  Labs as ordered for work-up  - Comprehensive metabolic panel  - Vitamin D Deficiency  - Parathyroid Hormone Intact  - Magnesium            Ronald Boykin MD  Internal Medicine Department  [facility]        "

## 2019-02-05 ENCOUNTER — TELEPHONE (OUTPATIENT)
Dept: INTERNAL MEDICINE | Facility: CLINIC | Age: 59
End: 2019-02-05

## 2019-02-05 NOTE — TELEPHONE ENCOUNTER
Patient calling was seen in  Urgent care in [place] for a sinus infection . Given 10 days of Augmentin. Pt feels this has not cleared up his infection and is requesting MD to prescribe another few days of medication . Pt declines having to come in to get more medication . Please prescribed.Kenia Marks RN

## 2019-02-06 NOTE — TELEPHONE ENCOUNTER
Patient has not completed the 10-day course of Augmentin even yet.  Augmentin would also be the drug of choice if  the patient truly had a bacterial sinus infection issue in 10 days should be adequate treatment if bacterial process present in the sinuses versus allergy issues versus viral process versus other..  If the patient  Is having symptoms still after completing the 10-day course of antibiotic, he should be seen in clinic for further evaluation rather than extending the Augmentin course even longer than 10 days

## 2019-02-18 ENCOUNTER — MYC MEDICAL ADVICE (OUTPATIENT)
Dept: INTERNAL MEDICINE | Facility: CLINIC | Age: 59
End: 2019-02-18

## 2019-02-18 DIAGNOSIS — F40.243 FEAR OF FLYING: Primary | ICD-10-CM

## 2019-02-20 RX ORDER — LORAZEPAM 0.5 MG/1
TABLET ORAL
Qty: 15 TABLET | Refills: 0 | Status: SHIPPED | OUTPATIENT
Start: 2019-02-20 | End: 2020-01-07

## 2019-02-20 NOTE — TELEPHONE ENCOUNTER
Patient has used 12 tablets in 3 years.  Therefore refill of medication appropriate.  Paper prescription in [place] basket.  Please fax to patient's pharmacy

## 2019-11-06 DIAGNOSIS — K21.9 GASTROESOPHAGEAL REFLUX DISEASE, ESOPHAGITIS PRESENCE NOT SPECIFIED: ICD-10-CM

## 2019-11-06 NOTE — LETTER
[facility]  [address] [id number] [phone]            Luis F Gipson  [address] [place] [address]  [place] [address]        November 6, 2019    Dear Luis F,    While refilling your prescription today, we noticed that you are due for an appointment with your provider.  We will refill your prescription for 30 days, but a follow-up appointment must be made before any additional refills can be approved.     Taking care of your health is important to us and we look forward to seeing you in the near future.  Please call us at [phone] or [phone] (or use [employer]) to schedule an appointment.     Please disregard this notice if you have already made an appointment.    Sincerely,        [facility]

## 2019-11-06 NOTE — TELEPHONE ENCOUNTER
"Requested Prescriptions   Pending Prescriptions Disp Refills     omeprazole (PRILOSEC) 20 MG DR capsule [Pharmacy Med Name: OMEPRAZOLE DR 20 MG CAPSULE] 90 capsule 3     Sig: TAKE 1 CAPSULE BY MOUTH EVERY DAY       PPI Protocol Passed - 11/6/2019  2:04 AM        Passed - Not on Clopidogrel (unless Pantoprazole ordered)        Passed - No diagnosis of osteoporosis on record        Passed - Recent (12 mo) or future (30 days) visit within the authorizing provider's specialty     Patient has had an office visit with the authorizing provider or a provider within the authorizing providers department within the previous 12 mos or has a future within next 30 days. See \"Patient Info\" tab in inbasket, or \"Choose Columns\" in Meds & Orders section of the refill encounter.              Passed - Medication is active on med list        Passed - Patient is age 18 or older        Medication is being filled for 1 time refill only due to:  Patient needs to be seen because it has been more than one year since last visit.    "

## 2019-12-01 DIAGNOSIS — K21.9 GASTROESOPHAGEAL REFLUX DISEASE, ESOPHAGITIS PRESENCE NOT SPECIFIED: ICD-10-CM

## 2019-12-02 NOTE — TELEPHONE ENCOUNTER
"Requested Prescriptions   Pending Prescriptions Disp Refills     omeprazole (PRILOSEC) 20 MG DR capsule [Pharmacy Med Name: OMEPRAZOLE DR 20 MG CAPSULE] 30 capsule 0     Sig: TAKE 1 CAPSULE BY MOUTH EVERY DAY   Last Written Prescription Date:  11/6/2019  Last Fill Quantity: 30,  # refills: 0   Last Office Visit: 11/8/2018   Future Office Visit:         PPI Protocol Failed - 12/1/2019 12:35 PM        Failed - Recent (12 mo) or future (30 days) visit within the authorizing provider's specialty     Patient has had an office visit with the authorizing provider or a provider within the authorizing providers department within the previous 12 mos or has a future within next 30 days. See \"Patient Info\" tab in inbasket, or \"Choose Columns\" in Meds & Orders section of the refill encounter.              Passed - Not on Clopidogrel (unless Pantoprazole ordered)        Passed - No diagnosis of osteoporosis on record        Passed - Medication is active on med list        Passed - Patient is age 18 or older          "

## 2019-12-03 ENCOUNTER — MYC MEDICAL ADVICE (OUTPATIENT)
Dept: INTERNAL MEDICINE | Facility: CLINIC | Age: 59
End: 2019-12-03

## 2020-01-07 ENCOUNTER — OFFICE VISIT (OUTPATIENT)
Dept: INTERNAL MEDICINE | Facility: CLINIC | Age: 60
End: 2020-01-07
Payer: COMMERCIAL

## 2020-01-07 VITALS
DIASTOLIC BLOOD PRESSURE: 84 MMHG | BODY MASS INDEX: 27.63 KG/M2 | SYSTOLIC BLOOD PRESSURE: 136 MMHG | HEIGHT: 70 IN | OXYGEN SATURATION: 98 % | TEMPERATURE: 98.4 F | RESPIRATION RATE: 16 BRPM | HEART RATE: 87 BPM | WEIGHT: 193 LBS

## 2020-01-07 DIAGNOSIS — Z00.00 ENCOUNTER FOR ROUTINE ADULT HEALTH EXAMINATION WITHOUT ABNORMAL FINDINGS: Primary | ICD-10-CM

## 2020-01-07 DIAGNOSIS — Z12.5 SCREENING FOR PROSTATE CANCER: ICD-10-CM

## 2020-01-07 DIAGNOSIS — R09.81 NASAL CONGESTION: ICD-10-CM

## 2020-01-07 DIAGNOSIS — K21.9 GASTROESOPHAGEAL REFLUX DISEASE, ESOPHAGITIS PRESENCE NOT SPECIFIED: ICD-10-CM

## 2020-01-07 DIAGNOSIS — F41.9 ANXIETY: ICD-10-CM

## 2020-01-07 DIAGNOSIS — Z23 NEED FOR SHINGLES VACCINE: ICD-10-CM

## 2020-01-07 LAB
ALBUMIN SERPL-MCNC: 4.2 G/DL (ref 3.4–5)
ALP SERPL-CCNC: 53 U/L (ref 40–150)
ALT SERPL W P-5'-P-CCNC: 37 U/L (ref 0–70)
ANION GAP SERPL CALCULATED.3IONS-SCNC: 4 MMOL/L (ref 3–14)
AST SERPL W P-5'-P-CCNC: 15 U/L (ref 0–45)
BILIRUB SERPL-MCNC: 2.6 MG/DL (ref 0.2–1.3)
BUN SERPL-MCNC: 11 MG/DL (ref 7–30)
CALCIUM SERPL-MCNC: 9.1 MG/DL (ref 8.5–10.1)
CHLORIDE SERPL-SCNC: 102 MMOL/L (ref 94–109)
CHOLEST SERPL-MCNC: 170 MG/DL
CO2 SERPL-SCNC: 29 MMOL/L (ref 20–32)
CREAT SERPL-MCNC: 1.04 MG/DL (ref 0.66–1.25)
ERYTHROCYTE [DISTWIDTH] IN BLOOD BY AUTOMATED COUNT: 12.7 % (ref 10–15)
GFR SERPL CREATININE-BSD FRML MDRD: 77 ML/MIN/{1.73_M2}
GLUCOSE SERPL-MCNC: 98 MG/DL (ref 70–99)
HCT VFR BLD AUTO: 49.6 % (ref 40–53)
HDLC SERPL-MCNC: 63 MG/DL
HGB BLD-MCNC: 17.4 G/DL (ref 13.3–17.7)
LDLC SERPL CALC-MCNC: 85 MG/DL
MCH RBC QN AUTO: 32.6 PG (ref 26.5–33)
MCHC RBC AUTO-ENTMCNC: 35.1 G/DL (ref 31.5–36.5)
MCV RBC AUTO: 93 FL (ref 78–100)
NONHDLC SERPL-MCNC: 107 MG/DL
PLATELET # BLD AUTO: 327 10E9/L (ref 150–450)
POTASSIUM SERPL-SCNC: 5 MMOL/L (ref 3.4–5.3)
PROT SERPL-MCNC: 7.3 G/DL (ref 6.8–8.8)
PSA SERPL-ACNC: 1.07 UG/L (ref 0–4)
RBC # BLD AUTO: 5.33 10E12/L (ref 4.4–5.9)
SODIUM SERPL-SCNC: 135 MMOL/L (ref 133–144)
TRIGL SERPL-MCNC: 112 MG/DL
WBC # BLD AUTO: 5.5 10E9/L (ref 4–11)

## 2020-01-07 PROCEDURE — 99213 OFFICE O/P EST LOW 20 MIN: CPT | Mod: 25 | Performed by: INTERNAL MEDICINE

## 2020-01-07 PROCEDURE — 36415 COLL VENOUS BLD VENIPUNCTURE: CPT | Performed by: INTERNAL MEDICINE

## 2020-01-07 PROCEDURE — 99396 PREV VISIT EST AGE 40-64: CPT | Mod: 25 | Performed by: INTERNAL MEDICINE

## 2020-01-07 PROCEDURE — 90750 HZV VACC RECOMBINANT IM: CPT | Performed by: INTERNAL MEDICINE

## 2020-01-07 PROCEDURE — 85027 COMPLETE CBC AUTOMATED: CPT | Performed by: INTERNAL MEDICINE

## 2020-01-07 PROCEDURE — 90471 IMMUNIZATION ADMIN: CPT | Performed by: INTERNAL MEDICINE

## 2020-01-07 PROCEDURE — 80061 LIPID PANEL: CPT | Performed by: INTERNAL MEDICINE

## 2020-01-07 PROCEDURE — 80053 COMPREHEN METABOLIC PANEL: CPT | Performed by: INTERNAL MEDICINE

## 2020-01-07 PROCEDURE — G0103 PSA SCREENING: HCPCS | Performed by: INTERNAL MEDICINE

## 2020-01-07 RX ORDER — PAROXETINE 10 MG/1
TABLET, FILM COATED ORAL
Qty: 45 TABLET | Refills: 3 | Status: SHIPPED | OUTPATIENT
Start: 2020-01-07 | End: 2020-12-08

## 2020-01-07 ASSESSMENT — MIFFLIN-ST. JEOR: SCORE: 1678.75

## 2020-01-07 ASSESSMENT — ANXIETY QUESTIONNAIRES
3. WORRYING TOO MUCH ABOUT DIFFERENT THINGS: NOT AT ALL
1. FEELING NERVOUS, ANXIOUS, OR ON EDGE: NOT AT ALL
GAD7 TOTAL SCORE: 2
5. BEING SO RESTLESS THAT IT IS HARD TO SIT STILL: SEVERAL DAYS
6. BECOMING EASILY ANNOYED OR IRRITABLE: NOT AT ALL
7. FEELING AFRAID AS IF SOMETHING AWFUL MIGHT HAPPEN: NOT AT ALL
IF YOU CHECKED OFF ANY PROBLEMS ON THIS QUESTIONNAIRE, HOW DIFFICULT HAVE THESE PROBLEMS MADE IT FOR YOU TO DO YOUR WORK, TAKE CARE OF THINGS AT HOME, OR GET ALONG WITH OTHER PEOPLE: NOT DIFFICULT AT ALL
2. NOT BEING ABLE TO STOP OR CONTROL WORRYING: NOT AT ALL

## 2020-01-07 ASSESSMENT — PATIENT HEALTH QUESTIONNAIRE - PHQ9: 5. POOR APPETITE OR OVEREATING: SEVERAL DAYS

## 2020-01-07 NOTE — PROGRESS NOTES
SUBJECTIVE:   CC: Luis F Gipson is an 59 year old male who presents for preventative health visit and f/u re GERD and anxiety.     Healthy Habits:     Getting at least 3 servings of Calcium per day:  Yes    Bi-annual eye exam:  NO    Dental care twice a year:  NO    Sleep apnea or symptoms of sleep apnea:  None    Diet:  Regular (no restrictions)    Frequency of exercise:  2-3 days/week    Duration of exercise:  30-45 minutes    Taking medications regularly:  Yes    Medication side effects:  None    PHQ-2 Total Score: 0    Additional concerns today:  No          Today's PHQ-2 Score:   PHQ-2 ( 1999 Pf[employer]) 1/7/2020   Q1: Little interest or pleasure in doing things 0   Q2: Feeling down, depressed or hopeless 0   PHQ-2 Score 0   Q1: Little interest or pleasure in doing things Not at all   Q2: Feeling down, depressed or hopeless Not at all   PHQ-2 Score 0       Abuse: Current or Past(Physical, Sexual or Emotional)- No  Do you feel safe in your environment? Yes    Have you ever done Advance Care Planning? (For example, a Health Directive, POLST, or a discussion with a medical provider or your loved ones about your wishes): No, advance care planning information given to patient to review.  Advanced care planning was discussed at today's visit.    Social History     Tobacco Use     Smoking status: Former Smoker     Types: Cigarettes     Last attempt to quit: 3/27/2009     Years since quitting: 10.7     Smokeless tobacco: Never Used     Tobacco comment: 2-3 daily   Substance Use Topics     Alcohol use: Yes     Comment: couple drinks 4 days/wekk     If you drink alcohol do you typically have >3 drinks per day or >7 drinks per week? No    Alcohol Use 1/7/2020   Prescreen: >3 drinks/day or >7 drinks/week? No   Prescreen: >3 drinks/day or >7 drinks/week? -   AUDIT SCORE  -       Last PSA:   PSA   Date Value Ref Range Status   08/17/2017 0.91 0 - 4 ug/L Final     Comment:     Assay Method:  Chemiluminescence using Siemens  "Vista analyzer       Reviewed orders with patient. Reviewed health maintenance and updated orders accordingly - Yes  Labs reviewed in EPIC    Reviewed and updated as needed this visit by clinical staff         Reviewed and updated as needed this visit by Provider            Review of Systems  CONSTITUTIONAL: NEGATIVE for fever, chills. Weight up 8 pounds  INTEGUMENTARY/SKIN: NEGATIVE for worrisome rashes, moles or lesions  EYES: NEGATIVE for vision changes or irritation. Due for eye exam  ENT: NEGATIVE for ear, mouth and throat problems. Occ nasal  conegstion  RESP: NEGATIVE for significant cough or SOB  CV: NEGATIVE for chest pain, palpitations or peripheral edema  GI: NEGATIVE for nausea, abdominal pain, heartburn, or change in bowel habits. Colonoscopy due 2021   male: negative for dysuria, hematuria, decreased urinary stream, erectile dysfunction, urethral discharge  MUSCULOSKELETAL: NEGATIVE for significant arthralgias or myalgia  NEURO: NEGATIVE for weakness, dizziness or paresthesias  PSYCHIATRIC: NEGATIVE for changes in mood or affect overall with med.ANGELICA = 2     OBJECTIVE:   /84   Pulse 87   Temp 98.4  F (36.9  C) (Temporal)   Resp 16   Ht 1.765 m (5' 9.5\")   Wt 87.5 kg (193 lb)   SpO2 98%   BMI 28.09 kg/m      Physical Exam  General appearance - healthy, alert, no distress. Calm affect  Skin - No rashes or lesions.  Head - normocephalic, atraumatic  Eyes - VIVEK, EOMI, fundi exam with nondilated pupils negative.  Ears - External ears normal. Canals clear. TM's normal.  Nose/Sinuses - Nares normal. Septum midline. Mucosa mildly edematous.  No drainage or sinus tenderness.  Oropharynx - No erythema, no adenopathy, no exudates.  Neck - Supple without adenopathy or thyromegaly. No bruits.  Lungs - Clear to auscultation without wheezes/rhonchi.  Heart - Regular rate and rhythm without murmurs, clicks, or gallops.  Nodes - No supraclavicular, axillary, or inguinal adenopathy palpable.  Abdomen - " "Abdomen soft, non-tender. BS normal. No masses or hepatosplenomegaly palpable. No bruits.  Extremities -No cyanosis, clubbing or edema.    Musculoskeletal - Spine ROM normal. Muscular strength intact.   Peripheral pulses - radial=4/4, femoral=4/4, posterior tibial=4/4, dorsalis pedis=4/4,  Neuro - Gait normal. Reflexes normal and symmetric. Sensation grossly WNL.  Genital - Normal-appearing male external genitalia. No scrotal masses or inguinal hernia palpable.   Rectal - Guaic negative stool. Normal tone. Prostate normal in size to palpation. No rectal masses or prostate nodularity palpable       ASSESSMENT/PLAN:   1. Encounter for routine adult health examination without abnormal findings   See below for screening labs and other HCM discussion. Otherwise UTD. Declines flu vaccine.    - Comprehensive metabolic panel  - Lipid panel reflex to direct LDL Fasting  - CBC with platelets    2. Gastroesophageal reflux disease, esophagitis presence not specified  HAs recurrence off of med. Continue current therapy  - omeprazole (PRILOSEC) 20 MG DR capsule; TAKE 1 CAPSULE BY MOUTH EVERY DAY  Dispense: 90 capsule; Refill: 3    3. Anxiety  Controlled. Continue med  - PARoxetine (PAXIL) 10 MG tablet; 1/2 tab daily  Dispense: 45 tablet; Refill: 3    4. Screening for prostate cancer   PSA ordered for intermittent monitoring after discussion with pt and pt preference. Not doing annual PSA per USPTF recs  - Prostate spec antigen screen    5. Need for shingles vaccine  - ZOSTER VACCINE RECOMBINANT ADJUVANTED IM NJX    6. Nasal congestion   Occurs with different times of the year. Probable allergen issue. Nasacort OTC as needed. Also discussed possible antihistamine use      COUNSELING:   Reviewed preventive health counseling, as reflected in patient instructions    Estimated body mass index is 26.83 kg/m  as calculated from the following:    Height as of 10/2/18: 1.778 m (5' 10\").    Weight as of 11/8/18: 84.8 kg (187 lb).   "   Weight management plan: Discussed healthy diet and exercise guidelines     reports that he quit smoking about 10 years ago. His smoking use included cigarettes. He has never used smokeless tobacco.      Counseling Resources:  ATP IV Guidelines  Pooled Cohorts Equation Calculator  FRAX Risk Assessment  ICSI Preventive Guidelines  Dietary Guidelines for Americans, 2010  USDA's MyPlate  ASA Prophylaxis  Lung CA Screening      PLAN:  Continue current meds  Prescriptions refilled.    Vaccinations:  Shingrix vaccine  Pt declines flu vaccine  Labs as ordered  Nasacort  (OTC) 2 spray each nostril daily at bedtime  to reduce nasal congestion. Saline nasal spray during the day as needed   Healthcare  Directive discussed and given copy.   Patient to complete and return to clinic  Continue diet/exercise    Pt was informed regarding extra E&M billing for management of new or established medical issues not related to today's wellness visit      Ronald Boykin MD  [facility]

## 2020-01-07 NOTE — TELEPHONE ENCOUNTER
"Requested Prescriptions   Pending Prescriptions Disp Refills     omeprazole (PRILOSEC) 20 MG DR capsule [Pharmacy Med Name: OMEPRAZOLE DR 20 MG CAPSULE] 30 capsule 1     Sig: TAKE 1 CAPSULE BY MOUTH EVERY DAY       PPI Protocol Passed - 1/7/2020 10:32 AM        Passed - Not on Clopidogrel (unless Pantoprazole ordered)        Passed - No diagnosis of osteoporosis on record        Passed - Recent (12 mo) or future (30 days) visit within the authorizing provider's specialty     Patient has had an office visit with the authorizing provider or a provider within the authorizing providers department within the previous 12 mos or has a future within next 30 days. See \"Patient Info\" tab in inbasket, or \"Choose Columns\" in Meds & Orders section of the refill encounter.              Passed - Medication is active on med list        Passed - Patient is age 18 or older        Routing refill request to provider for review/approval because:  Annie given x1 and patient did not follow up, please advise  Patient needs to be seen because it has been more than 1 year since last office visit.        "

## 2020-01-07 NOTE — PATIENT INSTRUCTIONS
Continue current meds  Prescriptions refilled.    Vaccinations:  Shingrix vaccine  Pt declines flu vaccine  Labs as ordered  Nasacort  (OTC) 2 spray each nostril daily at bedtime  to reduce nasal congestion. Saline nasal spray during the day as needed   Healthcare  Directive discussed and given copy.   Patient to complete and return to clinic  Continue diet/exercise

## 2020-01-08 ASSESSMENT — ANXIETY QUESTIONNAIRES: GAD7 TOTAL SCORE: 2

## 2020-01-10 ENCOUNTER — MYC MEDICAL ADVICE (OUTPATIENT)
Dept: INTERNAL MEDICINE | Facility: CLINIC | Age: 60
End: 2020-01-10

## 2020-01-10 DIAGNOSIS — J01.90 ACUTE SINUSITIS WITH SYMPTOMS > 10 DAYS: Primary | ICD-10-CM

## 2020-01-10 RX ORDER — DOXYCYCLINE 100 MG/1
100 CAPSULE ORAL 2 TIMES DAILY
Qty: 20 CAPSULE | Refills: 0 | Status: SHIPPED | OUTPATIENT
Start: 2020-01-10 | End: 2020-01-20

## 2020-01-26 ENCOUNTER — MYC MEDICAL ADVICE (OUTPATIENT)
Dept: INTERNAL MEDICINE | Facility: CLINIC | Age: 60
End: 2020-01-26

## 2020-01-26 DIAGNOSIS — J01.90 ACUTE SINUSITIS WITH SYMPTOMS > 10 DAYS: Primary | ICD-10-CM

## 2020-01-27 NOTE — TELEPHONE ENCOUNTER
PCP please advise of MyChart message regarding continued Nasal Congestion after finishing Abx (Doxyclinie).    Please advise if provider would like to extend abx treatment or have patent follow up in clinic for re-eval/ or via Virtual Visit.     Iris SAMN, RN, PHN

## 2020-12-07 DIAGNOSIS — K21.9 GASTROESOPHAGEAL REFLUX DISEASE: ICD-10-CM

## 2020-12-07 DIAGNOSIS — F41.9 ANXIETY: ICD-10-CM

## 2020-12-08 RX ORDER — PAROXETINE 10 MG/1
TABLET, FILM COATED ORAL
Qty: 45 TABLET | Refills: 0 | Status: SHIPPED | OUTPATIENT
Start: 2020-12-08 | End: 2020-12-16

## 2020-12-11 ENCOUNTER — NURSE TRIAGE (OUTPATIENT)
Dept: NURSING | Facility: CLINIC | Age: 60
End: 2020-12-11

## 2020-12-11 ENCOUNTER — TELEPHONE (OUTPATIENT)
Dept: INTERNAL MEDICINE | Facility: CLINIC | Age: 60
End: 2020-12-11

## 2020-12-11 DIAGNOSIS — F41.9 ANXIETY: ICD-10-CM

## 2020-12-11 NOTE — TELEPHONE ENCOUNTER
Called about refill request for paxil tablets.  Caller was informed that the order was sent to [facility] [id number] in [facility] on 12/8 at 3.44 pm.  Emily Arrington RN    Additional Information    Negative: Drug overdose and triager unable to answer question    Negative: Caller requesting information unrelated to medicine    Negative: Caller requesting a prescription for Strep throat and has a positive culture result    Negative: Rash while taking a medication or within 3 days of stopping it    Negative: Immunization reaction suspected    Negative: Asthma and having symptoms of asthma (cough, wheezing, etc.)    Negative: Breastfeeding questions about mother's medicines and diet    Negative: MORE THAN A DOUBLE DOSE of a prescription or over-the-counter (OTC) drug    Negative: DOUBLE DOSE (an extra dose or lesser amount) of over-the-counter (OTC) drug and any symptoms (e.g., dizziness, nausea, pain, sleepiness)    Negative: DOUBLE DOSE (an extra dose or lesser amount) of prescription drug and any symptoms (e.g., dizziness, nausea, pain, sleepiness)    Negative: Took another person's prescription drug    Negative: DOUBLE DOSE (an extra dose or lesser amount) of prescription drug and NO symptoms (Exception: a double dose of antibiotics)    Negative: Diabetes drug error or overdose (e.g., took wrong type of insulin or took extra dose)    Negative: Caller has medication question about med not prescribed by PCP and triager unable to answer question (e.g., compatibility with other med, storage)    Negative: Request for URGENT new prescription or refill of 'essential' medication (i.e., likelihood of harm to patient if not taken) and triager unable to fill per department policy    Negative: Prescription not at pharmacy and was prescribed today by PCP    Negative: Pharmacy calling with prescription questions and triager unable to answer question    Negative: Caller has urgent medication question about med that PCP prescribed  and triager unable to answer question    Negative: Caller has NON-URGENT medication question about med that PCP prescribed and triager unable to answer question    Negative: Caller requesting a NON-URGENT new prescription or refill and triager unable to refill per department policy    Negative: DOUBLE DOSE (an extra dose or lesser amount) of over-the-counter (OTC) drug and NO symptoms    Negative: DOUBLE DOSE (an extra dose or lesser amount) of antibiotic drug and NO symptoms    Caller has medication question only, adult not sick, and triager answers question     paxil    Protocols used: MEDICATION QUESTION CALL-A-[place]

## 2020-12-16 RX ORDER — PAROXETINE 10 MG/1
TABLET, FILM COATED ORAL
Qty: 90 TABLET | Refills: 1 | Status: SHIPPED | OUTPATIENT
Start: 2020-12-16 | End: 2021-06-12

## 2020-12-16 NOTE — TELEPHONE ENCOUNTER
Correct dose is 10mg tab, 1 tab daily. Instructions changed on Rx and RF sent to cover the next 6 mos. Pt to see me in Spring 2021 for f/u. Inform pt and then close encounter

## 2020-12-16 NOTE — TELEPHONE ENCOUNTER
Reason for Call: Clarification      Detailed comments: patient is stating that he is suppose to take 1 tablet not 1/2 tablet.    Phone Number Patient can be reached at: Home number on file [phone] (home)    Best Time: Anytime    Can we leave a detailed message on this number? YES    Call taken on 12/16/2020 at 10:09 AM by Shar Felton

## 2021-03-24 ENCOUNTER — MYC MEDICAL ADVICE (OUTPATIENT)
Dept: INTERNAL MEDICINE | Facility: CLINIC | Age: 61
End: 2021-03-24

## 2021-03-24 ENCOUNTER — NURSE TRIAGE (OUTPATIENT)
Dept: INTERNAL MEDICINE | Facility: CLINIC | Age: 61
End: 2021-03-24

## 2021-03-24 NOTE — TELEPHONE ENCOUNTER
Additional Information    Negative: Difficult to awaken or acting confused (e.g., disoriented, slurred speech)    Negative: Weakness of the face, arm or leg on one side of the body and new onset    Negative: Numbness of the face, arm or leg on one side of the body and new onset    Negative: Loss of speech or garbled speech and new onset    Negative: Passed out (i.e., fainted, collapsed and was not responding)    Negative: Sounds like a life-threatening emergency to the triager    Negative: Followed a head injury within last 3 days    Negative: Traumatic Brain Injury (TBI) is suspected    Negative: Sinus pain of forehead and yellow or green nasal discharge    Negative: Pregnant    Negative: Unable to walk without falling    Negative: Stiff neck (can't touch chin to chest)    Negative: Possibility of carbon monoxide exposure    Negative: SEVERE headache, states 'worst headache' of life    Negative: SEVERE headache, sudden onset (i.e., reaching maximum intensity within 30 seconds)    Negative: Severe pain in one eye    Negative: Loss of vision or double vision    Negative: Patient sounds very sick or weak to the triager    Negative: New headache and weak immune system (e.g., HIV positive, cancer chemotherapy, chronic steroid treatment)    Negative: Fever present > 3 days (72 hours)    Negative: Patient wants to be seen    Negative: SEVERE headache (e.g., excruciating) and has had severe headaches before    Negative: SEVERE headache and not relieved by pain meds    Negative: SEVERE headache and vomiting    Negative: SEVERE headache and fever    Negative: Fever > 103 F (39.4 C)    Negative: Fever > 100.0 F (37.8 C) and has diabetes mellitus or a weak immune system (e.g., HIV positive, cancer chemotherapy, organ transplant, splenectomy, chronic steroids)    Headache is a chronic symptom (recurrent or ongoing AND lasting > 4 weeks)    Negative: New headache and age > 50    Negative: Unexplained headache that is present  "> 24 hours    Negative: Headache started during sex    Answer Assessment - Initial Assessment Questions  1. LOCATION: \"Where does it hurt?\"       Starts with an Aura; then feels generalized head pressure about 15-20minutes afterwards  2. ONSET: \"When did the headache start?\" (Minutes, hours or days)       Off an on for the last 3 weeks. Can last several minutes to hours  3. PATTERN: \"Does the pain come and go, or has it been constant since it started?\"      Comes and goes. Headache sets in after having the aura  4. SEVERITY: \"How bad is the pain?\" and \"What does it keep you from doing?\"  (e.g., Scale 1-10; mild, moderate, or severe)    - MILD (1-3): doesn't interfere with normal activities     - MODERATE (4-7): interferes with normal activities or awakens from sleep     - SEVERE (8-10): excruciating pain, unable to do any normal activities         Moderate  5. RECURRENT SYMPTOM: \"Have you ever had headaches before?\" If so, ask: \"When was the last time?\" and \"What happened that time?\"       Last headache, today. Last night aura started then it went away on it's own. Has happened off and on in the last couple of days  6. CAUSE: \"What do you think is causing the headache?\"      Not sure. No caffeine use. Increase stress  7. MIGRAINE: \"Have you been diagnosed with migraine headaches?\" If so, ask: \"Is this headache similar?\"       No hx of migraine dx  8. HEAD INJURY: \"Has there been any recent injury to the head?\"       No  9. OTHER SYMPTOMS: \"Do you have any other symptoms?\" (fever, stiff neck, eye pain, sore throat, cold symptoms)      None  10. PREGNANCY: \"Is there any chance you are pregnant?\" \"When was your last menstrual period?\"        n/a    Protocols used: HEADACHE-A-[place]      "

## 2021-05-23 DIAGNOSIS — K21.9 GASTROESOPHAGEAL REFLUX DISEASE: ICD-10-CM

## 2021-05-23 DIAGNOSIS — K21.9 GASTROESOPHAGEAL REFLUX DISEASE WITHOUT ESOPHAGITIS: Primary | ICD-10-CM

## 2021-05-24 NOTE — TELEPHONE ENCOUNTER
Routing refill request to provider for review/approval because:  Patient needs to be seen because it has been more than 1 year since last office visit.    Joanna VALERA RN, BSN, PHN

## 2021-05-25 ENCOUNTER — MYC MEDICAL ADVICE (OUTPATIENT)
Dept: INTERNAL MEDICINE | Facility: CLINIC | Age: 61
End: 2021-05-25

## 2021-06-12 DIAGNOSIS — F41.9 ANXIETY: ICD-10-CM

## 2021-06-12 RX ORDER — PAROXETINE 10 MG/1
TABLET, FILM COATED ORAL
Qty: 30 TABLET | Refills: 0 | Status: SHIPPED | OUTPATIENT
Start: 2021-06-12 | End: 2021-07-13

## 2021-07-12 ASSESSMENT — ENCOUNTER SYMPTOMS
HEMATOCHEZIA: 0
HEMATURIA: 0
WEAKNESS: 0
SHORTNESS OF BREATH: 0
ARTHRALGIAS: 0
DIARRHEA: 0
NAUSEA: 0
PALPITATIONS: 0
ABDOMINAL PAIN: 0
DYSURIA: 0
CONSTIPATION: 0
FEVER: 0
FREQUENCY: 0
CHILLS: 0
MYALGIAS: 0
JOINT SWELLING: 0
DIZZINESS: 0
HEADACHES: 0
SORE THROAT: 0
COUGH: 0
NERVOUS/ANXIOUS: 0
EYE PAIN: 0
PARESTHESIAS: 0
HEARTBURN: 0

## 2021-07-13 ENCOUNTER — OFFICE VISIT (OUTPATIENT)
Dept: INTERNAL MEDICINE | Facility: CLINIC | Age: 61
End: 2021-07-13
Payer: COMMERCIAL

## 2021-07-13 VITALS
DIASTOLIC BLOOD PRESSURE: 78 MMHG | SYSTOLIC BLOOD PRESSURE: 132 MMHG | TEMPERATURE: 97.4 F | WEIGHT: 192 LBS | BODY MASS INDEX: 27.49 KG/M2 | HEART RATE: 85 BPM | HEIGHT: 70 IN | OXYGEN SATURATION: 98 % | RESPIRATION RATE: 16 BRPM

## 2021-07-13 DIAGNOSIS — F41.9 ANXIETY: ICD-10-CM

## 2021-07-13 DIAGNOSIS — Z12.11 SPECIAL SCREENING FOR MALIGNANT NEOPLASMS, COLON: ICD-10-CM

## 2021-07-13 DIAGNOSIS — Z00.00 ENCOUNTER FOR ROUTINE ADULT HEALTH EXAMINATION WITHOUT ABNORMAL FINDINGS: Primary | ICD-10-CM

## 2021-07-13 DIAGNOSIS — K21.9 GASTROESOPHAGEAL REFLUX DISEASE WITHOUT ESOPHAGITIS: ICD-10-CM

## 2021-07-13 LAB
ALBUMIN SERPL-MCNC: 3.9 G/DL (ref 3.4–5)
ALP SERPL-CCNC: 53 U/L (ref 40–150)
ALT SERPL W P-5'-P-CCNC: 33 U/L
ANION GAP SERPL CALCULATED.3IONS-SCNC: 2 MMOL/L (ref 3–14)
AST SERPL W P-5'-P-CCNC: 15 U/L (ref 0–45)
BILIRUB SERPL-MCNC: 2 MG/DL (ref 0.2–1.3)
BUN SERPL-MCNC: 11 MG/DL (ref 7–30)
CALCIUM SERPL-MCNC: 8.7 MG/DL (ref 8.5–10.1)
CHLORIDE BLD-SCNC: 104 MMOL/L
CHOLEST SERPL-MCNC: 178 MG/DL
CO2 SERPL-SCNC: 30 MMOL/L (ref 20–32)
CREAT SERPL-MCNC: 1.07 MG/DL
ERYTHROCYTE [DISTWIDTH] IN BLOOD BY AUTOMATED COUNT: 12.6 % (ref 10–15)
FASTING STATUS PATIENT QL REPORTED: YES
GFR SERPL CREATININE-BSD FRML MDRD: 73 ML/MIN/1.73M2
GLUCOSE BLD-MCNC: 63 MG/DL (ref 70–99)
HCT VFR BLD AUTO: 46.8 % (ref 40–53)
HDLC SERPL-MCNC: 66 MG/DL
HGB BLD-MCNC: 16.2 G/DL (ref 13.3–17.7)
LDLC SERPL CALC-MCNC: 92 MG/DL
MCH RBC QN AUTO: 33.4 PG (ref 26.5–33)
MCHC RBC AUTO-ENTMCNC: 34.6 G/DL (ref 31.5–36.5)
MCV RBC AUTO: 97 FL (ref 78–100)
NONHDLC SERPL-MCNC: 112 MG/DL
PLATELET # BLD AUTO: 358 10E3/UL (ref 150–450)
POTASSIUM BLD-SCNC: 4.3 MMOL/L (ref 3.4–5.3)
PROT SERPL-MCNC: 6.6 G/DL (ref 6.8–8.8)
PSA SERPL-MCNC: 1.12 UG/L
RBC # BLD AUTO: 4.85 10E6/UL (ref 4.4–5.9)
SODIUM SERPL-SCNC: 136 MMOL/L (ref 133–144)
TRIGL SERPL-MCNC: 99 MG/DL
WBC # BLD AUTO: 5.4 10E3/UL (ref 4–11)

## 2021-07-13 PROCEDURE — 80061 LIPID PANEL: CPT | Performed by: INTERNAL MEDICINE

## 2021-07-13 PROCEDURE — 99214 OFFICE O/P EST MOD 30 MIN: CPT | Mod: 25 | Performed by: INTERNAL MEDICINE

## 2021-07-13 PROCEDURE — 36415 COLL VENOUS BLD VENIPUNCTURE: CPT | Performed by: INTERNAL MEDICINE

## 2021-07-13 PROCEDURE — 80053 COMPREHEN METABOLIC PANEL: CPT | Performed by: INTERNAL MEDICINE

## 2021-07-13 PROCEDURE — 85027 COMPLETE CBC AUTOMATED: CPT | Performed by: INTERNAL MEDICINE

## 2021-07-13 PROCEDURE — G0103 PSA SCREENING: HCPCS | Performed by: INTERNAL MEDICINE

## 2021-07-13 PROCEDURE — 99396 PREV VISIT EST AGE 40-64: CPT | Performed by: INTERNAL MEDICINE

## 2021-07-13 RX ORDER — PAROXETINE 10 MG/1
10 TABLET, FILM COATED ORAL DAILY
Qty: 90 TABLET | Refills: 3 | Status: SHIPPED | OUTPATIENT
Start: 2021-07-13 | End: 2022-09-12

## 2021-07-13 ASSESSMENT — ENCOUNTER SYMPTOMS
HEMATURIA: 0
PARESTHESIAS: 0
WEAKNESS: 0
ARTHRALGIAS: 0
DIZZINESS: 0
SORE THROAT: 0
DIARRHEA: 0
CHILLS: 0
ABDOMINAL PAIN: 0
NAUSEA: 0
PALPITATIONS: 0
EYE PAIN: 0
DYSURIA: 0
SHORTNESS OF BREATH: 0
MYALGIAS: 0
HEADACHES: 0
JOINT SWELLING: 0
FREQUENCY: 0
FEVER: 0
CONSTIPATION: 0
COUGH: 0
NERVOUS/ANXIOUS: 0
HEMATOCHEZIA: 0
HEARTBURN: 0

## 2021-07-13 ASSESSMENT — ANXIETY QUESTIONNAIRES
3. WORRYING TOO MUCH ABOUT DIFFERENT THINGS: NOT AT ALL
7. FEELING AFRAID AS IF SOMETHING AWFUL MIGHT HAPPEN: NOT AT ALL
5. BEING SO RESTLESS THAT IT IS HARD TO SIT STILL: NOT AT ALL
IF YOU CHECKED OFF ANY PROBLEMS ON THIS QUESTIONNAIRE, HOW DIFFICULT HAVE THESE PROBLEMS MADE IT FOR YOU TO DO YOUR WORK, TAKE CARE OF THINGS AT HOME, OR GET ALONG WITH OTHER PEOPLE: NOT DIFFICULT AT ALL
GAD7 TOTAL SCORE: 0
1. FEELING NERVOUS, ANXIOUS, OR ON EDGE: NOT AT ALL
6. BECOMING EASILY ANNOYED OR IRRITABLE: NOT AT ALL
2. NOT BEING ABLE TO STOP OR CONTROL WORRYING: NOT AT ALL

## 2021-07-13 ASSESSMENT — PATIENT HEALTH QUESTIONNAIRE - PHQ9: 5. POOR APPETITE OR OVEREATING: NOT AT ALL

## 2021-07-13 ASSESSMENT — MIFFLIN-ST. JEOR: SCORE: 1672.16

## 2021-07-13 NOTE — PATIENT INSTRUCTIONS
Continue current meds  Prescriptions refilled.    Labs today as ordered  Follow up in 1 year. Earlier as needed  Screening colonoscopy  with  [facility]. Call  them at [phone] to schedule the procedure.   Schedule an eye appointment  Continue diet and exercise  I would recommend you receive an influenza (flu) vaccine  this Fall (October or early November)  Pt was informed regarding extra E&M billing for management of new or established medical issues not related to today's wellness visit

## 2021-07-13 NOTE — PROGRESS NOTES
SUBJECTIVE:   CC: Luis F Gipson is an 61 year old male who presents for preventative health visit and follow-up regarding chronic GERD and anxiety    Healthy Habits:     Getting at least 3 servings of Calcium per day:  NO    Bi-annual eye exam:  NO    Dental care twice a year:  Yes    Sleep apnea or symptoms of sleep apnea:  None    Diet:  Regular (no restrictions)    Frequency of exercise:  2-3 days/week    Duration of exercise:  30-45 minutes    Medication side effects:  None    PHQ-2 Total Score: 0        Today's PHQ-2 Score:   PHQ-2 Score:     PHQ-2 (  [employer]) 2021   Q1: Little interest or pleasure in doing things 0 0   Q2: Feeling down, depressed or hopeless 0 0   PHQ-2 Score 0 0   Q1: Little interest or pleasure in doing things Not at all Not at all   Q2: Feeling down, depressed or hopeless Not at all Not at all   PHQ-2 Score 0 0     Abuse: Current or Past(Physical, Sexual or Emotional)- No  Do you feel safe in your environment? Yes        Social History     Tobacco Use     Smoking status: Former Smoker     Types: Cigarettes     Quit date: 3/27/2009     Years since quittin.3     Smokeless tobacco: Never Used     Tobacco comment: 2-3 daily   Substance Use Topics     Alcohol use: Yes     Comment: couple drinks 4 days/wekk     If you drink alcohol do you typically have >3 drinks per day or >7 drinks per week? Yes      Alcohol Use 2021   Prescreen: >3 drinks/day or >7 drinks/week? Yes   Prescreen: >3 drinks/day or >7 drinks/week? -   AUDIT SCORE  8     AUDIT - Alcohol Use Disorders Identification Test - Reproduced from the World Health Organization Audit 2001 (Second Edition) 2021   1.  How often do you have a drink containing alcohol? 4 or more times a week   2.  How many drinks containing alcohol do you have on a typical day when you are drinking? 3 or 4   3.  How often do you have five or more drinks on one occasion? Less than monthly   4.  How often during the last year  have you found that you were not able to stop drinking once you had started? Never   5.  How often during the last year have you failed to do what was normally expected of you because of drinking? Never   6.  How often during the last year have you needed a first drink in the morning to get yourself going after a heavy drinking session? Never   7.  How often during the last year have you had a feeling of guilt or remorse after drinking? Never   8.  How often during the last year have you been unable to remember what happened the night before because of your drinking? Never   9.  Have you or someone else been injured because of your drinking? No   10. Has a relative, friend, doctor or other health care worker been concerned about your drinking or suggested you cut down? Yes, but not in the last year   TOTAL SCORE 8       Last PSA:   PSA   Date Value Ref Range Status   01/07/2020 1.07 0 - 4 ug/L Final     Comment:     Assay Method:  Chemiluminescence using Siemens Vista analyzer       Reviewed orders with patient. Reviewed health maintenance and updated orders accordingly - Yes  Labs reviewed in EPIC    Reviewed and updated as needed this visit by clinical staff                 Reviewed and updated as needed this visit by Provider                    Review of Systems   Constitutional: Negative for chills and fever.   HENT: Negative for congestion, ear pain, hearing loss and sore throat.    Eyes: Negative for pain and visual disturbance.   Respiratory: Negative for cough and shortness of breath.    Cardiovascular: Negative for chest pain, palpitations and peripheral edema.   Gastrointestinal: Negative for abdominal pain, constipation, diarrhea, heartburn, hematochezia and nausea.   Genitourinary: Negative for discharge, dysuria, frequency, genital sores, hematuria, impotence and urgency.   Musculoskeletal: Negative for arthralgias, joint swelling and myalgias.   Skin: Negative for rash.   Neurological: Negative for  "dizziness, weakness, headaches and paresthesias.   Psychiatric/Behavioral: Negative for mood changes. The patient is not nervous/anxious.       GERD well controlled with PPI. Has recurrence off of med   Weight down 1 pounds   Mood good with Paxil. ANGELICA  = 0    OBJECTIVE:   /78   Pulse 85   Temp 97.4  F (36.3  C) (Temporal)   Resp 16   Ht 1.778 m (5' 10\")   Wt 87.1 kg (192 lb)   SpO2 98%   BMI 27.55 kg/m      Physical Exam  General appearance - healthy, alert, no distress  Skin - No rashes or lesions.  Head - normocephalic, atraumatic  Eyes - VIVEK, EOMI, fundi exam with nondilated pupils negative.  Ears - External ears normal. Canals clear. TM's normal.  Nose/Sinuses - Nares normal. Septum midline. Mucosa normal. No drainage or sinus tenderness.  Oropharynx - No erythema, no adenopathy, no exudates.  Neck - Supple without adenopathy or thyromegaly. No bruits.  Lungs - Clear to auscultation without wheezes/rhonchi.  Heart - Regular rate and rhythm without murmurs, clicks, or gallops.  Nodes - No supraclavicular, axillary, or inguinal adenopathy palpable.  Abdomen - Abdomen soft, non-tender. BS normal. No masses or hepatosplenomegaly palpable. No bruits.  Extremities -No cyanosis, clubbing or edema.    Musculoskeletal - Spine ROM normal. Muscular strength intact.   Peripheral pulses - radial=4/4, femoral=4/4, posterior tibial=4/4, dorsalis pedis=4/4,  Neuro - Gait normal. Reflexes normal and symmetric. Sensation grossly WNL.  Genital - Normal-appearing male external genitalia. No scrotal masses or inguinal hernia palpable.   Rectal - Guaic negative stool. Normal tone. Prostate normal in size to palpation. No rectal masses or prostate nodularity palpable      ASSESSMENT/PLAN:   1. Encounter for routine adult health examination without abnormal findings  Screening labs as ordered.  Due for colonoscopy screening.  Due for eye exam.  Other healthcare maintenance up-to-date.  Encouraged flu vaccine in the " "fall  - Comprehensive metabolic panel; Future  - CBC with platelets; Future  - Prostate Specific Antigen Screen; Future  - Lipid panel reflex to direct LDL Fasting; Future  - Comprehensive metabolic panel  - CBC with platelets  - Prostate Specific Antigen Screen  - Lipid panel reflex to direct LDL Fasting    2. Gastroesophageal reflux disease without esophagitis  Symptoms well controlled with PPI therapy.  Has recurrence off of medication.  Continue omeprazole  - omeprazole (PRILOSEC) 20 MG DR capsule; TAKE 1 CAPSULE BY MOUTH EVERY DAY  Dispense: 90 capsule; Refill: 3    3. Anxiety   ANGELICA = 0Well-controlled.  Continue current medication  - PARoxetine (PAXIL) 10 MG tablet; Take 1 tablet (10 mg) by mouth daily  Dispense: 90 tablet; Refill: 3    4. Special screening for malignant neoplasms, colon  Due for colon cancer screening  - Adult Gastro Ref - Procedure Only; Future      Patient has been advised of split billing requirements and indicates understanding: Yes       COUNSELING:   Reviewed preventive health counseling, as reflected in patient instructions    Estimated body mass index is 28.09 kg/m  as calculated from the following:    Height as of 1/7/20: 1.765 m (5' 9.5\").    Weight as of 1/7/20: 87.5 kg (193 lb).     Weight management plan: Discussed healthy diet and exercise guidelines    He reports that he quit smoking about 12 years ago. His smoking use included cigarettes. He has never used smokeless tobacco.      Counseling Resources:  ATP IV Guidelines  Pooled Cohorts Equation Calculator  FRAX Risk Assessment  ICSI Preventive Guidelines  Dietary Guidelines for Americans, 2010  USDA's MyPlate  ASA Prophylaxis  Lung CA Screening      PLAN:  Continue current meds  Prescriptions refilled.    Labs today as ordered  Follow up in 1 year. Earlier as needed  Screening colonoscopy  with  [facility]. Call  them at [phone] to schedule the procedure.   Schedule an eye appointment  Continue diet and " exercise  I would recommend you receive an influenza (flu) vaccine  this Fall (October or early November)  Pt was informed regarding extra E&M billing for management of new or established medical issues not related to today's wellness visit    Ronald Boykin MD  [facility]

## 2021-07-14 RX ORDER — PAROXETINE 10 MG/1
TABLET, FILM COATED ORAL
Qty: 30 TABLET | Refills: 0 | OUTPATIENT
Start: 2021-07-14

## 2021-07-14 ASSESSMENT — ANXIETY QUESTIONNAIRES: GAD7 TOTAL SCORE: 0

## 2021-07-20 ENCOUNTER — MYC MEDICAL ADVICE (OUTPATIENT)
Dept: INTERNAL MEDICINE | Facility: CLINIC | Age: 61
End: 2021-07-20

## 2021-07-27 ENCOUNTER — TELEPHONE (OUTPATIENT)
Dept: INTERNAL MEDICINE | Facility: CLINIC | Age: 61
End: 2021-07-27

## 2021-07-27 NOTE — TELEPHONE ENCOUNTER
PCP, patient still looking for lab result response from physical exam on 7/13/2021.     Labs were reviewed with patient but he is still looking for a final note from you.     Heather Cummings RN  [facility]

## 2021-09-21 ENCOUNTER — TRANSFERRED RECORDS (OUTPATIENT)
Dept: HEALTH INFORMATION MANAGEMENT | Facility: CLINIC | Age: 61
End: 2021-09-21

## 2021-10-11 ENCOUNTER — MYC MEDICAL ADVICE (OUTPATIENT)
Dept: INTERNAL MEDICINE | Facility: CLINIC | Age: 61
End: 2021-10-11

## 2021-10-12 NOTE — TELEPHONE ENCOUNTER
Called [facility] to obtain colonoscopy report from Sept. 2021. Sylvie from [facility] will fax it over. Waiting on report to send to scanning.

## 2021-10-12 NOTE — TELEPHONE ENCOUNTER
Spoke with pt and also responded back in Mychart.  Please call [facility] and get copy of pt's last colonoscopy from Sept 2021 faxed to us so can be scanned into chart. Then close encounter

## 2021-10-18 ENCOUNTER — TELEPHONE (OUTPATIENT)
Dept: INTERNAL MEDICINE | Facility: CLINIC | Age: 61
End: 2021-10-18

## 2021-10-18 NOTE — TELEPHONE ENCOUNTER
patient out of town this weekend- getting ready for further treatment on a crown- cheek swelled up- pain in the tooth- sent in a pcn prescription   Patient started it yesterday- went in this am-tooth was cracked in 1/2- they took the tooth out   patient states his bp was high there   9/21 163/83 at colonscopy  9/27/ dentist 152/85    patient does not check at home- has always been normal.  Advised a nurse only appointment   scheduled for tomorrow    .Odette HAHNRN BSN  [facility] [place]  [phone]

## 2021-10-19 ENCOUNTER — ALLIED HEALTH/NURSE VISIT (OUTPATIENT)
Dept: INTERNAL MEDICINE | Facility: CLINIC | Age: 61
End: 2021-10-19
Payer: COMMERCIAL

## 2021-10-19 VITALS — DIASTOLIC BLOOD PRESSURE: 84 MMHG | SYSTOLIC BLOOD PRESSURE: 158 MMHG

## 2021-10-19 DIAGNOSIS — Z01.30 BLOOD PRESSURE CHECK: Primary | ICD-10-CM

## 2021-10-19 PROCEDURE — 99207 PR NO CHARGE NURSE ONLY: CPT

## 2021-10-19 RX ORDER — PENICILLIN V POTASSIUM 500 MG/1
TABLET, FILM COATED ORAL
COMMUNITY
Start: 2021-10-17 | End: 2022-04-06

## 2021-10-19 NOTE — PROGRESS NOTES
I met with Luis F Gipson at the request of dentistry to recheck his blood pressure.  Blood pressure medications on the med list were reviewed with patient.    Patient has taken all medications as per usual regimen: Yes  Patient reports tolerating them without any issues or concerns: Yes    Vitals:    10/19/21 1111 10/19/21 1121   BP: (!) 172/92 (!) 158/84   BP Location: Right arm Right arm   Patient Position: Chair Chair   Cuff Size: Adult Regular Adult Regular       Blood pressure was taken, previous encounter was reviewed, patient was instructed to follow up with PCP for bp recheck.

## 2022-04-06 ENCOUNTER — NURSE TRIAGE (OUTPATIENT)
Dept: INTERNAL MEDICINE | Facility: CLINIC | Age: 62
End: 2022-04-06
Payer: COMMERCIAL

## 2022-04-06 ENCOUNTER — VIRTUAL VISIT (OUTPATIENT)
Dept: FAMILY MEDICINE | Facility: CLINIC | Age: 62
End: 2022-04-06
Payer: COMMERCIAL

## 2022-04-06 DIAGNOSIS — G43.109 MIGRAINE WITH AURA AND WITHOUT STATUS MIGRAINOSUS, NOT INTRACTABLE: Primary | ICD-10-CM

## 2022-04-06 PROCEDURE — 99213 OFFICE O/P EST LOW 20 MIN: CPT | Mod: 95 | Performed by: FAMILY MEDICINE

## 2022-04-06 RX ORDER — SUMATRIPTAN 25 MG/1
25 TABLET, FILM COATED ORAL
Qty: 16 TABLET | Refills: 0 | Status: SHIPPED | OUTPATIENT
Start: 2022-04-06 | End: 2022-05-11

## 2022-04-06 NOTE — TELEPHONE ENCOUNTER
Patient calling for increased frequency of migraines. States he will usually get migraines once every 3 months but in the last 4 days he is had 8 migraines. States that with these migraines he will get an aura for 20 mins, then will get an all over headache. Reports headache has been constant and lingering now. In the past has had a flare up due to dehydration but is drinking water and Gatorade and no relief to symptoms. Pt will take 4 ibuprofen at the onset of aura, but this has not been helping with migraine pain. Has not been on medications specifically for migraines in the past. Denies fever, or any vision changes except the aura. Mild nausea, no emesis.     VV scheduled with provider for today.  Appointments in Next Year    Apr 06, 2022  4:00 PM  (Arrive by 3:40 PM)  Provider Visit with Madan Landis MD  [facility] ([facility] ) [phone]            Reason for Disposition    Unexplained headache that is present > 24 hours    Additional Information    Negative: Difficult to awaken or acting confused (e.g., disoriented, slurred speech)    Negative: Numbness of the face, arm or leg on one side of the body and new onset    Negative: Weakness of the face, arm or leg on one side of the body and new onset    Negative: Loss of speech or garbled speech and new onset    Negative: Passed out (i.e., fainted, collapsed and was not responding)    Negative: Sounds like a life-threatening emergency to the triager    Negative: Followed a head injury within last 3 days    Negative: Traumatic Brain Injury (TBI) is suspected    Negative: Sinus pain of forehead and yellow or green nasal discharge    Negative: Pregnant    Negative: Unable to walk without falling    Negative: Stiff neck (can't touch chin to chest)    Negative: Possibility of carbon monoxide exposure    Negative: Loss of vision or double vision (Exception: same as prior migraines)    Negative: Severe pain in one  eye    Negative: SEVERE headache, sudden-onset (i.e., reaching maximum intensity within seconds to 1 hour)    Negative: Patient sounds very sick or weak to the triager    Negative: New headache and weak immune system (e.g., HIV positive, cancer chemo, splenectomy, organ transplant, chronic steroids)    Negative: Fever present > 3 days (72 hours)    Negative: Patient wants to be seen    Negative: Fever > 103 F (39.4 C)    Negative: Fever > 100.0 F (37.8 C) and has diabetes mellitus or a weak immune system (e.g., HIV positive, cancer chemotherapy, organ transplant, splenectomy, chronic steroids)    Negative: SEVERE headache (e.g., excruciating) and has had severe headaches before    Negative: SEVERE headache and not relieved by pain meds    Negative: SEVERE headache and vomiting    Negative: SEVERE headache and fever    Protocols used: HEADACHE-A-OH

## 2022-04-06 NOTE — PROGRESS NOTES
"Luis F is a 62 year old who is being evaluated via a billable telephone visit.      What phone number would you like to be contacted at? [phone]    How would you like to obtain your AVS? MyChart    Assessment & Plan     Migraine with aura and without status migrainosus, not intractable  Discussed with the patient etiology of migraine headaches.  Most of the migraine headaches abortive management depend on onset of headache is advised to take Imitrex around her back and repeat in 2 hours if needed.  If needed can add ibuprofen to that.  Make a headache diary and if symptoms are not improving he is to follow-up.  Also advised to check his blood pressure at home if possible if he does not have any needs recheck he should come to the clinic to get it checked as well.  Neurological signs were discussed with the patient  - SUMAtriptan (IMITREX) 25 MG tablet; Take 1 tablet (25 mg) by mouth at onset of headache for migraine May repeat in 2 hours. Max 8 tablets/24 hours.         BMI:   Estimated body mass index is 27.55 kg/m  as calculated from the following:    Height as of 7/13/21: 1.778 m (5' 10\").    Weight as of 7/13/21: 87.1 kg (192 lb).       Madan Landis MD  [facility]    Hernan Kerns is a 62 year old who presents for the following health issues         Pain History:  When did you first notice your pain? - Less than 1 week   Have you seen anyone else for your pain? Yes   Where in your body do you have pain? Headache    Onset/Duration: 1 week  Description  Location: whole head  Character: throbbing pain, sharp pain  Frequency:  Almost daily  Duration:  hours  Wake with headaches: no  Able to do daily activities when headache present: YES  Intensity:  severe  Progression of Symptoms:  intermittent  Accompanying signs and symptoms:  Stiff neck: no  Neck or upper back pain: no  Sinus or URI symptoms no   Fever: no  Nausea or vomiting: YES  Dizziness: no  Numbness/tingling: " no  Weakness: no  Visual changes: aura  History  Head trauma: no  Family history of migraines: YES  Daily pain medication use: YES  Previous tests for headaches: no  Neurologist evaluation: no    Therapies tried and outcome: Ibuprofen             Patient does have a history of migraine but he is having more frequent headaches.  Ibuprofen helps but sometimes not enough.  Denies any nausea vomiting however he does have some aura associated with that which improved with ibuprofen.  He is not sure if he has high blood pressure or low.  In the past he has few readings high.  Denies any fever chills denies any recent triggers that he knows of.      Review of Systems   Constitutional, HEENT, cardiovascular, pulmonary, gi and gu systems are negative, except as otherwise noted.      Objective           Vitals:  No vitals were obtained today due to virtual visit.    Physical Exam   healthy, alert and no distress  PSYCH: Alert and oriented times 3; coherent speech, normal   rate and volume, able to articulate logical thoughts, able   to abstract reason, no tangential thoughts, no hallucinations   or delusions  His affect is normal  RESP: No cough, no audible wheezing, able to talk in full sentences  Remainder of exam unable to be completed due to telephone visits                Phone call duration: 8 minutes

## 2022-05-10 ENCOUNTER — OFFICE VISIT (OUTPATIENT)
Dept: INTERNAL MEDICINE | Facility: CLINIC | Age: 62
End: 2022-05-10
Payer: COMMERCIAL

## 2022-05-10 VITALS
OXYGEN SATURATION: 98 % | HEART RATE: 76 BPM | RESPIRATION RATE: 16 BRPM | WEIGHT: 199 LBS | SYSTOLIC BLOOD PRESSURE: 124 MMHG | HEIGHT: 70 IN | TEMPERATURE: 97.4 F | DIASTOLIC BLOOD PRESSURE: 78 MMHG | BODY MASS INDEX: 28.49 KG/M2

## 2022-05-10 DIAGNOSIS — G43.809 OTHER MIGRAINE WITHOUT STATUS MIGRAINOSUS, NOT INTRACTABLE: Primary | ICD-10-CM

## 2022-05-10 DIAGNOSIS — M54.2 CERVICALGIA: ICD-10-CM

## 2022-05-10 DIAGNOSIS — R06.83 SNORING: ICD-10-CM

## 2022-05-10 PROCEDURE — 99214 OFFICE O/P EST MOD 30 MIN: CPT | Performed by: INTERNAL MEDICINE

## 2022-05-10 RX ORDER — TOPIRAMATE 25 MG/1
25 TABLET, FILM COATED ORAL 2 TIMES DAILY
Qty: 60 TABLET | Refills: 11 | Status: SHIPPED | OUTPATIENT
Start: 2022-05-10 | End: 2024-01-29

## 2022-05-10 NOTE — PROGRESS NOTES
ASSESSMENT:   1. Other migraine without status migrainosus, not intractable  Chronic history of intermittent migraine headaches but more frequent recently possibly related to alcohol use and/or sleep apnea.  No headache over the last couple days when not having any alcohol.  Patient denies any headache or other neurologic symptoms today.  We will continue Imitrex as needed but will also start topiramate for preventative treatment for now while also referring patient for a sleep study and counseled to minimize or fully discontinue alcohol use for now if headaches remain absent for extended period of time without alcohol or if found to have sleep apnea and treated,, will then try tapering patient off of Topamax in the future.  If headaches not improving with topiramate or worsen in the future, patient will inform physician and will then obtain imaging of the brain  - topiramate (TOPAMAX) 25 MG tablet; Take 1 tablet (25 mg) by mouth 2 times daily  Dispense: 60 tablet; Refill: 11  - Adult Sleep Eval & Management  Referral; Future    2. Snoring  At risk for sleep apnea which could contribute to headaches.  Referral placed for sleep clinic evaluation  - Adult Sleep Eval & Management  Referral; Future    3. Cervicalgia  Appears related to strain with caring heavy cement bags.  No radicular symptoms and symptoms better and minimal currently.  Discussed stretching range of motion exercises for neck in the a.m.  Heat as needed      PLAN:   Referral to  Sleep clinic for probable obstructive sleep apnea. They will call to schedule  Topirimate 25mg tab, 1 tab daily in AM for 1 week .Then increase to 1 tab twice a day for migraine prevention   If breakthrough migraine, then may use Sumatriptan  Heat/stretching of neck in AM. If neck not improving, then let me know and will refer to physical therapy  Send me update in [place] in 4 weeks, earlier if side effects with the medication or headache  frequency/severity worsens..  If so, will then obtain imaging of the brain  Stop intake of alcohol or at least limit to 1 drink a couple times a week         (Chart documentation was completed, in part, with [employer] voice-recognition software. Even though reviewed, some grammatical, spelling, and word errors may remain.)    Ronald Boykin MD  Internal Medicine Department  [facility] JHONNY Kerns is a 62 year old who presents for the following health issues     History of Present Illness       Reason for visit:  Migraine with aura and neck problem    He eats 0-1 servings of fruits and vegetables daily.He consumes 0 sweetened beverage(s) daily.He exercises with enough effort to increase his heart rate 20 to 29 minutes per day.  He exercises with enough effort to increase his heart rate 5 days per week.   He is taking medications regularly.      Most recent lab results reviewed with pt.      History of migraine headaches intermittently over the last 20+ years per patient.  Had previously responded well to taking 4 ibuprofen tablets.   Symptoms worsened some more recently and was seen by other provider 1 month ago and prescribed Imitrex to use as needed.  Patient was prescribed a 25 mg dose.  In the last week, patient has had 4 migraine headaches.  Patient gets visual aura prior to the headaches.  Patient denies a headache today.  Patient states that he had been drinking alcohol more recently and having 2-4 beers every other day though patient has not had any alcohol now for a couple days.  Denies any withdrawal issues with this.  Patient has a girlfriend and he states that she is commented on him snoring at times.  Unclear if he has had apnea episodes.  Rarely taking a nap.  Good exercise tolerance.  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  Patient did not have chest pain or shortness of breath with that.  He did note some mild neck pain after and some noise with neck range of motion.  No radicular symptoms into the extremities.  Patient denies visual changes besides when having the migraine aura.  No nausea or vomiting with these.  Denies nucchal rigidity sx. no pain with chewing       Additional ROS:   Constitutional, HEENT, Cardiovascular, Pulmonary, GI and , Neuro, MSK and Psych review of systems/symptoms are otherwise negative or unchanged from previous, except as noted above.      OBJECTIVE:  /78   Pulse 76   Temp 97.4  F (36.3  C) (Temporal)   Resp 16   Ht 1.778 m (5' 10\")   Wt 90.3 kg (199 lb)   SpO2 98%   BMI 28.55 kg/m     Estimated body mass index is 28.55 kg/m  as calculated from the following:    Height as of this encounter: 1.778 m (5' 10\").    Weight as of this encounter: 90.3 kg (199 lb).  Eye: PERRL, EOMI  HENT: ear canals and TM's normal and nose and mouth without ulcers or lesions.  No TMJ or temporal artery area tenderness to palpation  Neck: no adenopathy. Thyroid normal to palpation. No bruits.  Minimal tenderness to palpation bilateral paracervical musculature.  Neck range of motion intact.  Pulm: Lungs clear to auscultation   CV: Regular rates and rhythm  GI: Soft, nontender, Normal active bowel sounds, No hepatosplenomegaly or masses palpable  Ext: Peripheral pulses intact. No edema.  Neuro: Normal strength and tone, sensory exam grossly normal                    "

## 2022-05-10 NOTE — PATIENT INSTRUCTIONS
Referral to  Sleep clinic for probable obstructive sleep apnea. They will call to schedule  Topirimate 25mg tab, 1 tab daily in AM for 1 week .Then increase to 1 tab twice a day for migraine prevention   If breakthrough migraine, then may use Sumatriptan  Heat/stretching of neck in AM. If neck not improving, then let me know and will refer to physical therapy  Send me update in Mychart in 4 weeks, earlier if side effects with the medication or headache frequency/severity worsens..  If so, will then obtain imaging of the brain  Stop intake of alcohol or at least limit to 1 drink a couple times a week

## 2022-05-11 ENCOUNTER — MYC REFILL (OUTPATIENT)
Dept: FAMILY MEDICINE | Facility: CLINIC | Age: 62
End: 2022-05-11
Payer: COMMERCIAL

## 2022-05-11 DIAGNOSIS — G43.109 MIGRAINE WITH AURA AND WITHOUT STATUS MIGRAINOSUS, NOT INTRACTABLE: ICD-10-CM

## 2022-05-12 RX ORDER — SUMATRIPTAN 25 MG/1
25 TABLET, FILM COATED ORAL
Qty: 16 TABLET | Refills: 0 | OUTPATIENT
Start: 2022-05-12

## 2022-05-12 RX ORDER — SUMATRIPTAN 25 MG/1
25 TABLET, FILM COATED ORAL
Qty: 30 TABLET | Refills: 5 | Status: SHIPPED | OUTPATIENT
Start: 2022-05-12 | End: 2024-01-29

## 2022-05-12 NOTE — TELEPHONE ENCOUNTER
Routing refill request to provider for review/approval because:   Serotonin Agonist request needs review.    Robert Burns RN  [facility] Triage Nurse

## 2022-05-12 NOTE — TELEPHONE ENCOUNTER
This is a duplicate refill request, that has been refused to the pharmacy.   Robert Burns RN  [facility] Triage Nurse

## 2022-09-10 DIAGNOSIS — K21.9 GASTROESOPHAGEAL REFLUX DISEASE WITHOUT ESOPHAGITIS: ICD-10-CM

## 2022-09-10 DIAGNOSIS — F41.9 ANXIETY: ICD-10-CM

## 2022-09-12 RX ORDER — PAROXETINE 10 MG/1
TABLET, FILM COATED ORAL
Qty: 90 TABLET | Refills: 0 | Status: SHIPPED | OUTPATIENT
Start: 2022-09-12 | End: 2022-12-13

## 2022-10-14 ENCOUNTER — TRANSFERRED RECORDS (OUTPATIENT)
Dept: HEALTH INFORMATION MANAGEMENT | Facility: CLINIC | Age: 62
End: 2022-10-14

## 2022-11-27 ENCOUNTER — HEALTH MAINTENANCE LETTER (OUTPATIENT)
Age: 62
End: 2022-11-27

## 2022-12-12 DIAGNOSIS — F41.9 ANXIETY: ICD-10-CM

## 2022-12-12 DIAGNOSIS — K21.9 GASTROESOPHAGEAL REFLUX DISEASE WITHOUT ESOPHAGITIS: ICD-10-CM

## 2022-12-13 ENCOUNTER — MYC MEDICAL ADVICE (OUTPATIENT)
Dept: INTERNAL MEDICINE | Facility: CLINIC | Age: 62
End: 2022-12-13

## 2022-12-13 ENCOUNTER — TELEPHONE (OUTPATIENT)
Dept: INTERNAL MEDICINE | Facility: CLINIC | Age: 62
End: 2022-12-13

## 2022-12-13 RX ORDER — PAROXETINE 10 MG/1
TABLET, FILM COATED ORAL
Qty: 90 TABLET | Refills: 0 | Status: SHIPPED | OUTPATIENT
Start: 2022-12-13 | End: 2022-12-29 | Stop reason: DRUGHIGH

## 2022-12-13 RX ORDER — PANTOPRAZOLE SODIUM 40 MG/1
40 TABLET, DELAYED RELEASE ORAL DAILY
Qty: 90 TABLET | Refills: 3 | Status: SHIPPED | OUTPATIENT
Start: 2022-12-13 | End: 2023-11-29

## 2022-12-13 NOTE — TELEPHONE ENCOUNTER
Pt called the clinic stating he went to the ED on Sunday and was found to have had a TIA. Pt stated 2 CTs, MRI and an ECHO were performed. 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 Pt stated no damage was done.     Pt was put on Plavix and a baby Asprin. Pt stated he forgot to tell the ED that he also take sumatriptan. Pt is wondering if there is any interactions with these meds? Advised pt to check with the pharmacist about the interactions.     Pt also requested the omeprazole and paroxetine be refilled. Informed pt the medication refills have been requested.     Routing to PCP if any further action is needed.   Please call pt back with recommendations from PCP.          No complaints No complaints No complaints No complaints No complaints No complaints No complaints No complaints No complaints No complaints No complaints No complaints No complaints

## 2022-12-13 NOTE — TELEPHONE ENCOUNTER
Per chart, RN approved Omeprazole refill request without MD knowledge prior.  Per other RN note below, pt now on Plavix from outside MD (had not been put in med list yet as no outside records to review. RF for Omeprazole cancelled and pt will be treated with Pantoprazole instead. Rx faxed for updated med and pt notified via [employer] message response to day

## 2022-12-13 NOTE — TELEPHONE ENCOUNTER
Prescription approved per [facility] Refill Protocol.  Rosalind Zapata RN  [facility]

## 2022-12-13 NOTE — TELEPHONE ENCOUNTER
Pt called requesting refills for these medications. Pt stated he was put on plavix at the hospital over the weekend (see TE from 12/13/22). Med list is not updated.      Per Omeprazole protocol      Not on Clopidogrel (unless Pantoprazole ordered)    Routing to provider to review and advise.

## 2022-12-14 NOTE — TELEPHONE ENCOUNTER
See response to [employer] message yesterday.  Patient changed to pantoprazole.  Proximately has been refilled for 90 days and patient has been instructed to make a follow-up virtual video appointment with me

## 2022-12-19 ENCOUNTER — VIRTUAL VISIT (OUTPATIENT)
Dept: INTERNAL MEDICINE | Facility: CLINIC | Age: 62
End: 2022-12-19
Payer: COMMERCIAL

## 2022-12-19 DIAGNOSIS — Z86.79 PERSONAL HISTORY OF CEREBRAL ARTERY STENOSIS: ICD-10-CM

## 2022-12-19 DIAGNOSIS — G45.9 TIA (TRANSIENT ISCHEMIC ATTACK): Primary | ICD-10-CM

## 2022-12-19 DIAGNOSIS — K21.9 GASTROESOPHAGEAL REFLUX DISEASE WITHOUT ESOPHAGITIS: ICD-10-CM

## 2022-12-19 DIAGNOSIS — R93.89 ABNORMAL CHEST CT: ICD-10-CM

## 2022-12-19 DIAGNOSIS — E87.1 HYPONATREMIA: ICD-10-CM

## 2022-12-19 PROCEDURE — 99214 OFFICE O/P EST MOD 30 MIN: CPT | Performed by: INTERNAL MEDICINE

## 2022-12-19 RX ORDER — CLOPIDOGREL BISULFATE 75 MG/1
TABLET ORAL
COMMUNITY
Start: 2022-12-12 | End: 2023-03-06

## 2022-12-19 RX ORDER — ASPIRIN 81 MG/1
TABLET, COATED ORAL
COMMUNITY
Start: 2022-12-12 | End: 2023-11-29

## 2022-12-19 NOTE — PROGRESS NOTES
"Luis F is a 62 year old who is being evaluated via a billable video visit.      How would you like to obtain your AVS? MyChart  If the video visit is dropped, the invitation should be resent by: Text to cell phone: [phone]  Will anyone else be joining your video visit? No        ASSESSMENT:   1. TIA (transient ischemic attack)  Asymptomatic.  We will get blood pressure checked through pharmacy.  Continue DAPT 3 mos amd then ASA lifelong and will follow-up neurology as scheduled    2. Personal history of cerebral artery stenosis  Right P2 segment severe stenosis on angio. Per neurology. He was started on 3 months of DAPT with lifelong ASA and  will have repeat CTA with Neurology follow up in 6 months to assess stability.    3. Abnormal chest CT  Per CT, \"Abutting and adjacent to the proximal descending thoracic aorta is a small nodular density measuring 6 mm concerning for small penetrating ulceration. Alternatively, left lung apex pulmonary nodule abutting the aortic pleura also possible. Recommend follow-up CT chest in 6 months to ensure stability\".     4. Gastroesophageal reflux disease without esophagitis  Controlled with PPI.  Due to use of Plavix, patient has been changed to pantoprazole.  Will change back to omeprazole in 3 months when off of Plavix    5. Hyponatremia  Sodium level 133.  Will recheck mid January  - Basic metabolic panel; Future      PLAN:  Get a blood pressure recheck   in  1 week at the  [facility] pharmacy. You will need to go to [email or web address] to schedule the blood pressure check appointment.  Continue current medications for now  Repeat CT chest with contrast in 6 months.  We will place future order  Follow-up with neurology in 6 months regarding cerebral artery narrowing and follow-up with them in 6 weeks per their recommendation regarding TIA  Follow-up with me in January as scheduled      Video-Visit Details    Type of service:  Video Visit    Video Start Time: 4:10 " "PM    Video End Time: 4:39 PM    Originating Location (pt. Location): Home    Distant Location (provider location):  [facility]     Platform used for Video Visit: Carlito Boykin MD  Internal Medicine Department  [facility]  Internal Medicine Department      (Chart documentation was completed, in part, with [employer] voice-recognition software. Even though reviewed, some grammatical, spelling, and word errors may remain.)         Hernan Kerns is a 62 year old, presenting for the following health issues:  Video Visit (Follow up after being at [facility] 8 days ago for 1 night, dx with TIA)     Discharge summary reviewed. Part of the summary as below:      INTERNAL MEDICINE HOSPITAL DISCHARGE SUMMARY    Patient ID:  Luis F Gipson  [id number]  62 y.o.  1960    Admit date: 12/11/2022    Discharge date and time: 12/12/2022     Final Discharge Diagnoses:   TIA (transient ischemic attack)  Esophageal reflux  Left-sided weakness  Hyponatremia  Abnormal CT scan  Cerebral aneurysm  * No resolved hospital problems. *      HPI: Per H&P,\"Luis F Gipson is a 62 y.o. male with a history of GERD and anxiety who is admitted from the ED for probable TIA. He was in his usual state of health today and was out with friends having some drinks when he suddenly felt weakness and paresthesia in his left arm and leg. He had difficulty walking/standing, so someone grabbed a chair and he sat down. One of his friends is a physician and tested his neurologic exam, he was weak in both the arm and leg but had no other symptoms. Specifically, no facial droop, speech change or dizziness. The symptoms completely resolved in about 10 minutes. He came here via EMS. He has had no recurrence. He has never had anything like this in the past. He is a former smoker, quit many years ago. Both maternal grandfather and uncle had strokes. He is healthy and has never been " "hospitalized. He received full-dose aspirin and 300 mg of clopidogrel in the ED and is admitted for further care after discussion with the on-call Neurologist.\"    Hospital Course:   Patient is a 62 year old male admitted with TIA with acute onset left sided weakness and paresthesias that self resolved. Neurology consulted and he had negative brain MRI but he does have Right P2 segment severe stenosis and this is likely symptomatic. He was started on 3 months of DAPT with lifelong ASA. He will heave repeat CTA with Neurology follow up in 6 months to assess stability.    - Procedures: none  - Consults: neurology  - Significant Diagnostic Studies:     CTH: IMPRESSION:  1. No acute intracranial process.    CTA head and neck:   IMPRESSION:   HEAD CTA:  1. Right P2 segment severe stenosis.  2. No additional significant stenosis/occlusion.  3. Right supraclinoid ICA demonstrates a small 2 mm inferior outpouching may reflect infundibulum with poorly visualized apical artery or small aneurysm.  NECK CTA:  1. No significant stenosis, occlusion, dissection.  2. Abutting and adjacent to the proximal descending thoracic aorta is a small nodular density measuring 6 mm concerning for small penetrating ulceration. Alternatively, left lung apex pulmonary nodule abutting the aortic pleura also possible. Recommend follow-up CT chest in 6 months to ensure stability.    MRI Brain:   IMPRESSION:   1. No acute intracranial abnormality.  2. Senescent changes of global cerebral volume loss and chronic microvascular ischemia.     EKG: Sinus rhythm  Prolonged QT  Abnormal ECG  No previous ECGs available    Echocardiogram:   CONCLUSIONS  1. Normal left ventricular size and systolic function. Normal LVEF 55  % by visual estimate. No LV thrombus.  2. Normal right ventricular size and systolic function.  3. No significant valvular dysfunction.  4. Mildly dilated ascending aorta (4.5 cm)  5. Negative bubble study with no evidence of interatrial " "shunt or PFO.      Discharge Exam:   BP (!) 166/106  Pulse 99  Temp 36.3  C (97.3  F) (Oral)  Resp 16  Ht 1.778 m (5' 10\")  Wt 88.4 kg (194 lb 14.4 oz)  SpO2 96%  BMI 27.97 kg/m      GENERAL: in no acute distress   HEENT: Sclera anicteric and conjunctivae clear. Mucus membranes pink and moist   LUNGS: CTA BL, no crackles, no wheezes   CV: RRR, no murmurs   ABD: Soft, NT/ND, nl BS, no guarding or rigidity   EXT: No cyanosis, clubbing, edema. Warm and well perfused   SKIN: Warm, no rashes, lesions, or masses seen   NEURO: Grossly intact  PSYCH: Normal mood and affect, Alert and oriented x 3    Disposition: home     Condition at Discharge: Stable    Pending Tests: None    Discharge Medications:   Done while pt is already discharged   Discharge Medication List as of 12/12/2022 2:02 PM       START taking these medications   Details   aspirin EC 81 MG enteric coated tablet Take 1 Tablet (81 mg) by mouth daily. Indications: Temporary Stroke, Disp-90 Tablet, R-3, DAILY Starting Mon 12/12/2022, Oral, E-Prescribing     clopidogrel (PLAVIX) 75 MG tablet Take 1 Tablet (75 mg) by mouth daily for 90 doses. Do not start before December 13, 2022., Disp-90 Tablet, R-0, DAILY Starting Tue 12/13/2022, Until Mon 3/13/2023, For 90 doses, Oral, E-Prescribing         CONTINUE these medications which have NOT CHANGED   Details   omeprazole (PRILOSEC) 20 MG capsule Take 1 Capsule (20 mg) by mouth daily., DAILY (NS) Starting Sat 12/10/2011, Oral, HistoricalPN:     PARoxetine (PAXIL) 10 MG tablet Take 1 Tablet (10 mg) by mouth every morning., MORNING Starting Sat 12/10/2011, Oral, HistoricalPN:         Code Status:  Code status: FULL    Follow up:   No follow-up provider specified.     Recommendations for primary care physician: Repeat CT chest in 6 months to assess abnormal 6mm nodularity shown on CT angio neck head from 12/12/2022    See the electronic medical record for full laboratory and diagnostic test results.  For full " discharge orders and instructions, please see the after visit summary for this hospitalization.    Total time spent on discharge: > 30 min    Rebeca Locke MD   [facility]       Most recent lab results reviewed with pt.       Discharge summary reviewed as above.  Denies current speech change, facial weakness, extremity weakness.  Symptoms overall lasted about 10 minutes occurring the day of onset per patient.  Has been exercising recently including running on a treadmill. Denies chest pain, shortness of breath, abdominal pain, headache, vision changes or side effects with medications.  Omeprazole has been changed to pantoprazole because of Plavix use.  GERD symptoms well controlled  Abnormal radiology studies regarding cerebral artery and thoracic aorta versus lung nodule noted.     Additional ROS:   Constitutional, HEENT, Cardiovascular, Pulmonary, GI and , Neuro, MSK and Psych review of systems/symptoms are otherwise negative or unchanged from previous, except as noted above.           Objective :  No vitals obtained today    Physical Exam:  GENERAL: Healthy, alert and no distress  EYES: Eyes grossly normal to inspection, conjunctivae and sclerae normal  RESP: no audible wheeze, cough, or visible cyanosis.  No visible retractions or increased work of breathing.  Able to speak fully in complete sentences.  NEURO: Cranial nerves grossly intact, mentation intact and speech normal  PSYCH: mentation appears normal, affect normal/bright, judgement and insight intact, normal speech and appearance well-groomed

## 2022-12-28 ENCOUNTER — MYC MEDICAL ADVICE (OUTPATIENT)
Dept: INTERNAL MEDICINE | Facility: CLINIC | Age: 62
End: 2022-12-28

## 2022-12-29 ENCOUNTER — NURSE TRIAGE (OUTPATIENT)
Dept: INTERNAL MEDICINE | Facility: CLINIC | Age: 62
End: 2022-12-29

## 2022-12-29 DIAGNOSIS — F41.9 ANXIETY: ICD-10-CM

## 2022-12-29 DIAGNOSIS — I10 HYPERTENSION, UNSPECIFIED TYPE: Primary | ICD-10-CM

## 2022-12-29 RX ORDER — AMLODIPINE AND BENAZEPRIL HYDROCHLORIDE 5; 10 MG/1; MG/1
1 CAPSULE ORAL DAILY
Qty: 30 CAPSULE | Refills: 11 | Status: SHIPPED | OUTPATIENT
Start: 2022-12-29 | End: 2023-01-06

## 2022-12-29 RX ORDER — PAROXETINE 10 MG/1
20 TABLET, FILM COATED ORAL DAILY
Qty: 180 TABLET | Refills: 3 | Status: SHIPPED | OUTPATIENT
Start: 2022-12-29 | End: 2023-01-13

## 2022-12-29 NOTE — TELEPHONE ENCOUNTER
Nurse Triage SBAR    Is this a 2nd Level Triage? YES, LICENSED PRACTITIONER REVIEW IS REQUIRED    Situation: Patient calling with reports of elevated BP.   Patient was exercising as usual, noticed HR 10 higher than usual when exercising, patient became anxious and stopped exercising. Patient sat down for 5m ins and checked BP. /106. Pt is Asymptomatic.    Background: patient Patient reports recent Dx of TIA while at [facility].     Patient reports he was advised to monitor BP during last VV and update PCP. Per chart review, RN unable to confirm this as VV notes are not signed, unable to see PCP recommendations.     Per pt MyChart messages 12/28/22, yesterdays BP readings were 151/94 at noon, and 168/99 at 6pm.    Assessment: See below.       Protocol Recommended Disposition:   See in Office Today      Recommendation: No appointments available in office today. RN advised patient to be seen in UC/ED. Patient declines UC/ED and would like PCP recommendations.      Routed to provider    Does the patient meet one of the following criteria for ADS visit consideration? 16+ years old, with an [facility] PCP     TIP  Providers, please consider if this condition is appropriate for management at one of our Acute and Diagnostic Services sites.     If patient is a good candidate, please use dotphrase <dot>triageresponse and select Refer to ADS to document.        Reason for Disposition    Systolic BP >= 180 OR Diastolic >= 110    Additional Information    Negative: Symptom is main concern (e.g., headache, chest pain)    Negative: Low blood pressure is main concern    Negative: Systolic BP >= 160 OR Diastolic >= 100, and any cardiac or neurologic symptoms (e.g., chest pain, difficulty breathing, unsteady gait, blurred vision)    Negative: Pregnant 20 or more weeks (or postpartum < 6 weeks) with new hand or face swelling    Negative: Pregnant 20 or more weeks (or postpartum < 6 weeks) and Systolic BP >= 160 OR Diastolic  ">= 100    Negative: Patient sounds very sick or weak to the triager    Negative: Systolic BP >= 200 OR Diastolic >= 120 and having NO cardiac or neurologic symptoms    Negative: Pregnant 20 or more weeks (or postpartum < 6 weeks) with Systolic BP >= 140 OR Diastolic >= 90    Negative: Systolic BP >= 180 OR Diastolic >= 110, and missed most recent dose of blood pressure medication    Answer Assessment - Initial Assessment Questions  1. BLOOD PRESSURE: \"What is the blood pressure?\" \"Did you take at least two measurements 5 minutes apart?\"      180/106  2. ONSET: \"When did you take your blood pressure?\"      10 mins ago  3. HOW: \"How did you obtain the blood pressure?\" (e.g., visiting nurse, automatic home BP monitor)      Home BP Cuff  4. HISTORY: \"Do you have a history of high blood pressure?\"      Not before Hosp. Admission for TIA  5. MEDICATIONS: \"Are you taking any medications for blood pressure?\" \"Have you missed any doses recently?\"      No  6. OTHER SYMPTOMS: \"Do you have any symptoms?\" (e.g., headache, chest pain, blurred vision, difficulty breathing, weakness)      No  7. PREGNANCY: \"Is there any chance you are pregnant?\" \"When was your last menstrual period?\"      N/A    Protocols used: BLOOD PRESSURE - HIGH-A-OH      "

## 2022-12-30 NOTE — TELEPHONE ENCOUNTER
Has been more anxious lately. Increased Paxil to 10mg tab, 2 tabs daily and feeling calmer overall  but still anxious some from blood pressure and. Seeing -180/90s. Higher right after exercise today per message above    Did elliptical  Last nigfht with aggressive exercise and also this afternoon and pushed HR up to the 120s. No  chest pain or  SOB or headache or speech changes and no extremity numbness. Anxiety about BPS might be driving some of the BP per pt belief. WIll therefore have pt stay at Paxil 20mg daily and start Lotrel 5/10mg capsule daily and get blood pressure check at  pharmacy by going online to [facility]/pharmacy and get appt next Tues or Wed and hold off on checking BPs at home in case causing more anxiety for now but inform MD earlier if gets any new sx CP, SOB, HA, etc

## 2023-01-05 ENCOUNTER — MYC MEDICAL ADVICE (OUTPATIENT)
Dept: INTERNAL MEDICINE | Facility: CLINIC | Age: 63
End: 2023-01-05

## 2023-01-05 ENCOUNTER — ALLIED HEALTH/NURSE VISIT (OUTPATIENT)
Dept: INTERNAL MEDICINE | Facility: CLINIC | Age: 63
End: 2023-01-05
Payer: COMMERCIAL

## 2023-01-05 VITALS — DIASTOLIC BLOOD PRESSURE: 96 MMHG | SYSTOLIC BLOOD PRESSURE: 168 MMHG

## 2023-01-05 DIAGNOSIS — I10 HYPERTENSION, UNSPECIFIED TYPE: ICD-10-CM

## 2023-01-05 DIAGNOSIS — Z01.30 BP CHECK: Primary | ICD-10-CM

## 2023-01-05 PROCEDURE — 99207 PR NO CHARGE NURSE ONLY: CPT | Performed by: INTERNAL MEDICINE

## 2023-01-05 NOTE — PROGRESS NOTES
Luis F Gipson was evaluated at [facility] on January 5, 2023 at which time his blood pressure was:    BP Readings from Last 3 Encounters:   01/05/23 (!) 168/96   05/10/22 124/78   10/19/21 (!) 158/84     Pulse Readings from Last 3 Encounters:   05/10/22 76   07/13/21 85   01/07/20 87     Too 2 bp readings. First one was with automatic machine 189/100 and second reading was with manual 168/96. Patient reports taking bp medication daily for 4 days. Patient reports slight blurred vision that has occurred since TIA.     Reviewed lifestyle modifications for blood pressure control and reduction: including making healthy food choices, managing weight, getting regular exercise, smoking cessation, reducing alcohol consumption, monitoring blood pressure regularly.     Symptoms: Blurry Vision    BP Goal:< 140/90 mmHg    BP Assessment:  BP too high    Potential Reasons for BP too high: NA - Not applicable    BP Follow-Up Plan: Referral to PCP    Recommendation to Provider: assess bp and treatment plan     Note completed by: Meenakshi Centeno, PharmD, [facility]  Pharmacy Specialist  [facility]  [phone]

## 2023-01-06 RX ORDER — AMLODIPINE AND BENAZEPRIL HYDROCHLORIDE 5; 10 MG/1; MG/1
1 CAPSULE ORAL 2 TIMES DAILY
Qty: 30 CAPSULE | Refills: 11
Start: 2023-01-06 | End: 2023-01-13 | Stop reason: DRUGHIGH

## 2023-01-06 NOTE — TELEPHONE ENCOUNTER
"Called and spoke to the patient in relation to the [employer] message received below. Dr. Boykin informed patient to stop taking blood pressure at home as patient gets \"fired up.\" Patient was advised instead to go into the pharmacy and have his blood pressure checked due to the fact that the patient was recently started on Amlodipine-Benazepril. Patient states he has been taking the medication on a regular basis (has not missed any doses).     No headache, no chest pain, and no difficulty breathing. Patient does complain of some blurred vision (started when patient was in the hospital and started on Plavix). Patient states it is \"not bad\" but is a little change. Patient states if he is trying to read the TV guide from 40 feet away, his vision is a little blurry. Patient states he does not wear glasses.     Will route to the provider for review. Patient would like a call back with the provider's recommendations.     Can we leave a detailed message on this number? YES  Phone number patient can be reached at: Home number on file [phone] (home)    Azucena Hawk RN  [facility] Triage      "

## 2023-01-07 NOTE — TELEPHONE ENCOUNTER
Spoke with patient.  Asymptomatic now.  Not checking blood pressure since coming back home from the pharmacy blood pressure check.  Will increase amlodipine/benazepril to 1 capsule twice daily and will call patient early next week to get him scheduled to see me in clinic for blood pressure recheck

## 2023-01-07 NOTE — CONFIDENTIAL NOTE
See Mychart encounter 1/5/2023.  Patient instructed to increase amlodipine/benazepril to 1 capsule twice daily and will contact him early next week to schedule follow-up appointment with me next week in reserve spot

## 2023-01-13 ENCOUNTER — OFFICE VISIT (OUTPATIENT)
Dept: INTERNAL MEDICINE | Facility: CLINIC | Age: 63
End: 2023-01-13
Payer: COMMERCIAL

## 2023-01-13 DIAGNOSIS — I10 HYPERTENSION, UNSPECIFIED TYPE: Primary | ICD-10-CM

## 2023-01-13 DIAGNOSIS — F41.9 ANXIETY: ICD-10-CM

## 2023-01-13 PROCEDURE — 99214 OFFICE O/P EST MOD 30 MIN: CPT | Performed by: INTERNAL MEDICINE

## 2023-01-13 RX ORDER — METOPROLOL SUCCINATE 50 MG/1
50 TABLET, EXTENDED RELEASE ORAL DAILY
Qty: 30 TABLET | Refills: 11 | Status: SHIPPED | OUTPATIENT
Start: 2023-01-13 | End: 2023-01-19

## 2023-01-13 RX ORDER — AMLODIPINE AND BENAZEPRIL HYDROCHLORIDE 10; 20 MG/1; MG/1
1 CAPSULE ORAL DAILY
Qty: 90 CAPSULE | Refills: 3 | Status: SHIPPED | OUTPATIENT
Start: 2023-01-13 | End: 2023-05-19 | Stop reason: ALTCHOICE

## 2023-01-13 RX ORDER — PAROXETINE 10 MG/1
20 TABLET, FILM COATED ORAL DAILY
Qty: 180 TABLET | Refills: 3 | Status: SHIPPED | OUTPATIENT
Start: 2023-01-13 | End: 2024-01-29

## 2023-01-13 NOTE — PATIENT INSTRUCTIONS
Change Amlodipine/Benazepril to 10/20mg capsule, 1 capsule daily.   Continue paroxetine 10mg  twice a  day   Start Metoprolol XL 50mg tab, 1 tab daily in AM for blood pressure, heart rate and anxiety  Follow-up in 1 week as scheduled

## 2023-01-13 NOTE — PROGRESS NOTES
ASSESSMENT:   1. Hypertension, unspecified type  Blood pressure much better but still mildly above goal < 130/80 with TIA history.  S till seems somewhat driven some by anxiety.  No symptoms with exertion.  We will add metoprolol.  Continue current Lotrel with capsule strength dose change to reduce quantity of capsules taken  - amLODIPine-benazepril (LOTREL) 10-20 MG capsule; Take 1 capsule by mouth daily  Dispense: 90 capsule; Refill: 3  - metoprolol succinate ER (TOPROL XL) 50 MG 24 hr tablet; Take 1 tablet (50 mg) by mouth daily  Dispense: 30 tablet; Refill: 11    2. Anxiety  Overall controlled but still mild intermittent symptoms.  Sleeping well.  We will add metoprolol as above  - PARoxetine (PAXIL) 10 MG tablet; Take 2 tablets (20 mg) by mouth daily  Dispense: 180 tablet; Refill: 3  - metoprolol succinate ER (TOPROL XL) 50 MG 24 hr tablet; Take 1 tablet (50 mg) by mouth daily  Dispense: 30 tablet; Refill: 11      PLAN:   Change Amlodipine/Benazepril to 10/20mg capsule, 1 capsule daily.   Continue paroxetine 10mg  twice a  day   Start Metoprolol XL 50mg tab, 1 tab daily in AM for blood pressure, heart rate and anxiety  Follow-up in 1 week as scheduled      (Chart documentation was completed, in part, with [employer] voice-recognition software. Even though reviewed, some grammatical, spelling, and word errors may remain.)    Ronald Boykin MD  Internal Medicine Department  [facility]      Hernan Kerns is a 62 year old, presenting for the following health issues:  Hypertension      History of Present Illness       Hypertension: He presents for follow up of hypertension.  He does not check blood pressure  regularly outside of the clinic. Outside blood pressures have been over 140/90. He follows a low salt diet.        Most recent lab results reviewed with pt.       Denies chest pain, shortness of breath, abdominal pain, headache, vision changes or side effects with  "medications.  Patiently recently increased paroxetine to 10 mg twice daily and increased Lotrel 5/20 mg capsule to 2 capsules daily.  Since that time, he states he is October.  Patient used to sometimes experience stronger heartbeats in his chest and sensations of heart rate sometimes faster but denies any episodes of irregular heartbeats.  Since medication changes, patient states he is worked out using an elliptical for 35 to 40 minutes and \"pushing it hard\" without chest pain or shortness of breath.  No recent headaches since medication change.  Patient states he feels calmer.  History of anxiety.  Denies depression  No recurrent TIA symptoms.       Additional ROS:   Constitutional, HEENT, Cardiovascular, Pulmonary, GI and , Neuro, MSK and Psych review of systems/symptoms are otherwise negative or unchanged from previous, except as noted above.      OBJECTIVE:  BP (!) 142/78   Pulse 99   Temp 98.2  F (36.8  C) (Temporal)   Wt 91.3 kg (201 lb 4.8 oz)   SpO2 99%   BMI 28.88 kg/m     Estimated body mass index is 28.88 kg/m  as calculated from the following:    Height as of 5/10/22: 1.778 m (5' 10\").    Weight as of this encounter: 91.3 kg (201 lb 4.8 oz).      Neck: no adenopathy. Thyroid normal to palpation. No bruits  Pulm: Lungs clear to auscultation   CV: Regular rates and rhythm  GI: Soft, nontender, Normal active bowel sounds, No hepatosplenomegaly or masses palpable  Ext: Peripheral pulses intact. No edema.  Gen: Calm affect currently                  "

## 2023-01-14 VITALS
WEIGHT: 201.3 LBS | OXYGEN SATURATION: 99 % | TEMPERATURE: 98.2 F | SYSTOLIC BLOOD PRESSURE: 142 MMHG | DIASTOLIC BLOOD PRESSURE: 78 MMHG | BODY MASS INDEX: 28.88 KG/M2 | HEART RATE: 99 BPM

## 2023-01-19 ENCOUNTER — ANCILLARY PROCEDURE (OUTPATIENT)
Dept: GENERAL RADIOLOGY | Facility: CLINIC | Age: 63
End: 2023-01-19
Attending: INTERNAL MEDICINE
Payer: COMMERCIAL

## 2023-01-19 ENCOUNTER — OFFICE VISIT (OUTPATIENT)
Dept: INTERNAL MEDICINE | Facility: CLINIC | Age: 63
End: 2023-01-19
Payer: COMMERCIAL

## 2023-01-19 VITALS
WEIGHT: 196.5 LBS | TEMPERATURE: 97.8 F | OXYGEN SATURATION: 100 % | RESPIRATION RATE: 20 BRPM | HEIGHT: 70 IN | BODY MASS INDEX: 28.13 KG/M2 | DIASTOLIC BLOOD PRESSURE: 74 MMHG | HEART RATE: 69 BPM | SYSTOLIC BLOOD PRESSURE: 136 MMHG

## 2023-01-19 DIAGNOSIS — G89.29 CHRONIC LEFT SHOULDER PAIN: ICD-10-CM

## 2023-01-19 DIAGNOSIS — M25.512 CHRONIC LEFT SHOULDER PAIN: ICD-10-CM

## 2023-01-19 DIAGNOSIS — Z00.01 ENCOUNTER FOR ROUTINE ADULT MEDICAL EXAM WITH ABNORMAL FINDINGS: ICD-10-CM

## 2023-01-19 DIAGNOSIS — E87.1 HYPONATREMIA: ICD-10-CM

## 2023-01-19 DIAGNOSIS — I10 HYPERTENSION, UNSPECIFIED TYPE: ICD-10-CM

## 2023-01-19 DIAGNOSIS — N52.9 ERECTILE DYSFUNCTION, UNSPECIFIED ERECTILE DYSFUNCTION TYPE: ICD-10-CM

## 2023-01-19 DIAGNOSIS — F41.9 ANXIETY: ICD-10-CM

## 2023-01-19 DIAGNOSIS — R93.89 ABNORMAL FINDING ON IMAGING: ICD-10-CM

## 2023-01-19 DIAGNOSIS — Z12.5 SCREENING FOR PROSTATE CANCER: ICD-10-CM

## 2023-01-19 LAB
ALBUMIN SERPL BCG-MCNC: 4.5 G/DL (ref 3.5–5.2)
ALP SERPL-CCNC: 59 U/L (ref 40–129)
ALT SERPL W P-5'-P-CCNC: 26 U/L (ref 10–50)
ANION GAP SERPL CALCULATED.3IONS-SCNC: 16 MMOL/L (ref 7–15)
AST SERPL W P-5'-P-CCNC: 32 U/L (ref 10–50)
BILIRUB SERPL-MCNC: 1.1 MG/DL
BUN SERPL-MCNC: 14.4 MG/DL (ref 8–23)
CALCIUM SERPL-MCNC: 9.9 MG/DL (ref 8.8–10.2)
CHLORIDE SERPL-SCNC: 99 MMOL/L (ref 98–107)
CREAT SERPL-MCNC: 0.99 MG/DL (ref 0.67–1.17)
DEPRECATED HCO3 PLAS-SCNC: 22 MMOL/L (ref 22–29)
GFR SERPL CREATININE-BSD FRML MDRD: 85 ML/MIN/1.73M2
GLUCOSE SERPL-MCNC: 64 MG/DL (ref 70–99)
POTASSIUM SERPL-SCNC: 4.9 MMOL/L (ref 3.4–5.3)
PROT SERPL-MCNC: 6.8 G/DL (ref 6.4–8.3)
PSA SERPL-MCNC: 0.79 NG/ML (ref 0–4.5)
SODIUM SERPL-SCNC: 137 MMOL/L (ref 136–145)

## 2023-01-19 PROCEDURE — 36415 COLL VENOUS BLD VENIPUNCTURE: CPT | Performed by: INTERNAL MEDICINE

## 2023-01-19 PROCEDURE — G0103 PSA SCREENING: HCPCS | Performed by: INTERNAL MEDICINE

## 2023-01-19 PROCEDURE — 73030 X-RAY EXAM OF SHOULDER: CPT | Mod: TC | Performed by: RADIOLOGY

## 2023-01-19 PROCEDURE — 99214 OFFICE O/P EST MOD 30 MIN: CPT | Mod: 25 | Performed by: INTERNAL MEDICINE

## 2023-01-19 PROCEDURE — 80053 COMPREHEN METABOLIC PANEL: CPT | Performed by: INTERNAL MEDICINE

## 2023-01-19 PROCEDURE — 99396 PREV VISIT EST AGE 40-64: CPT | Performed by: INTERNAL MEDICINE

## 2023-01-19 RX ORDER — METOPROLOL SUCCINATE 50 MG/1
TABLET, EXTENDED RELEASE ORAL
Qty: 30 TABLET | Refills: 11
Start: 2023-01-19 | End: 2023-06-16

## 2023-01-19 ASSESSMENT — ENCOUNTER SYMPTOMS
SORE THROAT: 0
HEARTBURN: 0
DIZZINESS: 0
JOINT SWELLING: 0
PALPITATIONS: 0
NAUSEA: 0
NERVOUS/ANXIOUS: 1
CONSTIPATION: 0
ABDOMINAL PAIN: 0
EYE PAIN: 0
FEVER: 0
MYALGIAS: 1
FREQUENCY: 0
SHORTNESS OF BREATH: 0
COUGH: 0
CHILLS: 0
WEAKNESS: 0
HEADACHES: 0
HEMATURIA: 0
PARESTHESIAS: 0
HEMATOCHEZIA: 0
DIARRHEA: 0
DYSURIA: 0
ARTHRALGIAS: 0

## 2023-01-19 ASSESSMENT — PAIN SCALES - GENERAL: PAINLEVEL: NO PAIN (0)

## 2023-01-19 NOTE — PATIENT INSTRUCTIONS
Reduce Metoprolol XL50 mg tab to 1/2 tab  daily to see if erectile symptoms improve  and heart rate/BP remain stable. If ED not improving, then let me know  Continue other medications for now. If cough increases, then let me know and will change Amlodipine/benazepril to separate Amlodipine and Losartan tablets  Labs today as ordered  Xray  left shoulder  Referral to Physical Therapy. They will call to schedule  If shoulder not improving, then let me know and will refer to Ortho   I would recommend a covid booster  and influenza vaccination. You may have it done at any pharmacy. If you wish to have it done at a [place] pharmacy, then go to [email or web address] to schedule a vaccination appointment  Repeat CT chest with contrast in 6 months.   Will place order in early June and contact you  Follow-up with neurology in  June regarding cerebral artery narrowing  and repeat brain imaging  Pt was informed regarding extra E&M billing for management of new or established medical issues not related to today's wellness/screening visit

## 2023-01-19 NOTE — PROGRESS NOTES
SUBJECTIVE:   CC: Luis F is an 62 year old who presents for preventative health visit, follow-up regarding hypertension/anxiety and other issues as below     Patient has been advised of split billing requirements and indicates understanding: Yes     HPI  Ability to successfully perform activities of daily living: Yes, no assistance needed  Home safety:  none identified   Hearing impairment:  No            Today's PHQ-2 Score:   PHQ-2 (  [employer]) 2023   Q1: Little interest or pleasure in doing things 0   Q2: Feeling down, depressed or hopeless 0   PHQ-2 Score 0   PHQ-2 Total Score (12-17 Years)- Positive if 3 or more points; Administer PHQ-A if positive -   Q1: Little interest or pleasure in doing things Not at all   Q2: Feeling down, depressed or hopeless Not at all   PHQ-2 Score 0           Social History     Tobacco Use     Smoking status: Former     Types: Cigarettes     Quit date: 3/27/2009     Years since quittin.8     Smokeless tobacco: Never     Tobacco comments:     2-3 daily   Substance Use Topics     Alcohol use: Yes     Comment: couple drinks 4 days/wekk         Alcohol Use 2023   Prescreen: >3 drinks/day or >7 drinks/week? Yes   Prescreen: >3 drinks/day or >7 drinks/week? -   AUDIT SCORE  6       Last PSA:   PSA   Date Value Ref Range Status   2020 1.07 0 - 4 ug/L Final     Comment:     Assay Method:  Chemiluminescence using Siemens Vista analyzer     Prostate Specific Antigen Screen   Date Value Ref Range Status   2021 1.12 ug/L Final       Reviewed orders with patient. Reviewed health maintenance and updated orders accordingly - Yes  Labs reviewed in EPIC    Reviewed and updated as needed this visit by clinical staff                  Reviewed and updated as needed this visit by Provider                     Review of Systems  CONSTITUTIONAL: NEGATIVE for fever, chills, change in weight  INTEGUMENTARY/SKIN: NEGATIVE for worrisome rashes, moles or lesions  EYES: NEGATIVE for  "vision changes or irritation. Due for eye exam   ENT: NEGATIVE for ear, mouth and throat problems. Mild nasal  Congestion in AM. Resolves in mid morning   RESP: NEGATIVE for  SOB.  Are dry cough  CV: NEGATIVE for chest pain, palpitations or peripheral edema.   GI: NEGATIVE for nausea, abdominal pain, heartburn, or change in bowel habits   male: negative for dysuria, hematuria, decreased urinary stream,  urethral discharge. Has mild ED since starting metopprolol  MUSCULOSKELETAL: NEGATIVE for significant arthralgias or myalgia except for left shoulder pain that is chronic. Trouble abducting beyond 90 degrees.   NEURO: NEGATIVE for weakness, dizziness or paresthesias. Has not had a migraine since last  TIA episode  PSYCHIATRIC: NEGATIVE for changes in mood or affect overall . Anxiety better with Metoprolol    See note in chart  12/19/22 re: brain and chest abnormal imaging and need for f/u imaging in June 2023    OBJECTIVE:   /74 (BP Location: Left arm, Patient Position: Sitting, Cuff Size: Adult Large)   Pulse 69   Temp 97.8  F (36.6  C) (Temporal)   Resp 20   Ht 1.778 m (5' 10\")   Wt 89.1 kg (196 lb 8 oz)   SpO2 100%   BMI 28.19 kg/m      Physical Exam  General appearance - healthy, alert, no distress. Calm affect  Skin - No rashes or lesions.  Head - normocephalic, atraumatic  Eyes - VIVEK, EOMI, fundi exam with nondilated pupils negative.  Ears - External ears normal. Canals clear. TM's normal.  Nose/Sinuses - Nares normal. Septum midline. Mucosa normal. No drainage or sinus tenderness.  Oropharynx - No erythema, no adenopathy, no exudates.  Neck - Supple without adenopathy or thyromegaly. No bruits.  Lungs - Clear to auscultation without wheezes/rhonchi.  Heart - Regular rate and rhythm without murmurs, clicks, or gallops.  Nodes - No supraclavicular, axillary, or inguinal adenopathy palpable.  Abdomen - Abdomen soft, non-tender. BS normal. No masses or hepatosplenomegaly palpable. No " bruits.  Extremities -No cyanosis, clubbing or edema.    Musculoskeletal - Spine ROM normal. Muscular strength intact.  Tenderness to internal/external rotation left shoulder and into abduction beyond 60 degrees left side.  Normal abduction and range of motion right shoulder  Peripheral pulses - radial=4/4, femoral=4/4, posterior tibial=4/4, dorsalis pedis=4/4,  Neuro - Gait normal. Reflexes normal and symmetric. Sensation grossly WNL.  Genital - Normal-appearing male external genitalia. No scrotal masses or inguinal hernia palpable.   Rectal - deferred          ASSESSMENT/PLAN:   1. Encounter for routine adult medical exam with abnormal findings  Candidate for COVID booster vaccination and influenza vaccination.  Patient declines today.  Prostate cancer screening as below.  Colon cancer screening up-to-date.  Due for eye exam    2. Chronic left shoulder pain  Chronic shoulder issues for years per patient.  No recent trauma.  Given chronicity, will get shoulder x-ray to assess the joint and refer to physical therapy.  If not improving, patient will let me know and will refer on to Ortho for possible cortisone injection trial and/or further imaging with MRI  - XR Shoulder Left 2 Views; Future  - Physical Therapy Referral; Future    3. Anxiety  Controlled with paroxetine and metoprolol.  Had recent ED side effects with current beta-blocker dose however.  Will reduce metoprolol and continue paroxetine  - metoprolol succinate ER (TOPROL XL) 50 MG 24 hr tablet; Take 1/2 tab daily by mouth  Dispense: 30 tablet; Refill: 11    4. Hypertension, unspecified type  Controlled.  Occasional dry cough but not very bothersome.  Continue Lotrel with lower dose metoprolol as below  - metoprolol succinate ER (TOPROL XL) 50 MG 24 hr tablet; Take 1/2 tab daily by mouth  Dispense: 30 tablet; Refill: 11  - Comprehensive metabolic panel; Future    5. Hyponatremia  Previous sodium level 133 during TIA hospitalization.  Needs lab recheck  -  "Comprehensive metabolic panel; Future    6. Erectile dysfunction, unspecified erectile dysfunction type  Symptoms started after metoprolol initiated.  Normal sex drive.  We will try reducing metoprolol dose to 25 mg daily.  If issues persist, patient will let me know    7. Abnormal finding on imaging  See note in chart  12/19/22 re: brain and chest abnormal imaging and need for f/u imaging in June 2023.  Staff message reminder note entered into chart to go to me in early June to order CT chest. Pt states  Non-[facility] neurology will be ordering future cerebral artery imaging studies    8. Screening for prostate cancer  Candidate for screening  - Prostate Specific Antigen Screen; Future      Patient has been advised of split billing requirements and indicates understanding: Yes      COUNSELING:   Reviewed preventive health counseling, as reflected in patient instructions      BMI:   Estimated body mass index is 28.88 kg/m  as calculated from the following:    Height as of 5/10/22: 1.778 m (5' 10\").    Weight as of 1/13/23: 91.3 kg (201 lb 4.8 oz).   Weight management plan: Discussed healthy diet and exercise guidelines      He reports that he quit smoking about 13 years ago. His smoking use included cigarettes. He has never used smokeless tobacco.        PLAN:  Reduce Metoprolol XL50 mg tab to 1/2 tab  daily to see if erectile symptoms improve  and heart rate/BP remain stable. If ED not improving, then let me know  Continue other medications for now. If cough increases, then let me know and will change Amlodipine/benazepril to separate Amlodipine and Losartan tablets  Labs today as ordered  Xray  left shoulder  Referral to Physical Therapy. They will call to schedule  If shoulder not improving, then let me know and will refer to Ortho   I would recommend a covid booster  and influenza vaccination. You may have it done at any pharmacy. If you wish to have it done at a [place] pharmacy, then go to [email or web address] " to schedule a vaccination appointment  Repeat CT chest with contrast in 6 months.   Will place order in early June and contact you  Follow-up with neurology in  June regarding cerebral artery narrowing  and repeat brain imaging  Pt was informed regarding extra E&M billing for management of new or established medical issues not related to today's wellness/screening visit    Ronald Boykin MD  [facility]

## 2023-01-19 NOTE — PATIENT INSTRUCTIONS
Get a blood pressure recheck   in  1 week at the  [facility] pharmacy. You will need to go to [email or web address] to schedule the blood pressure check appointment.  Continue current medications for now  Repeat CT chest with contrast in 6 months.  We will place future order  Follow-up with neurology in 6 months regarding cerebral artery narrowing and follow-up with them in 6 weeks per their recommendation regarding TIA  Follow-up with me in January as scheduled

## 2023-03-06 RX ORDER — CLOPIDOGREL BISULFATE 75 MG/1
TABLET ORAL
OUTPATIENT
Start: 2023-03-06

## 2023-03-07 NOTE — TELEPHONE ENCOUNTER
Plavix was indicated for 90 days only.  Medication has been discontinued.  Refill declined.  Patient informed via [employer]

## 2023-04-18 ENCOUNTER — MYC MEDICAL ADVICE (OUTPATIENT)
Dept: INTERNAL MEDICINE | Facility: CLINIC | Age: 63
End: 2023-04-18
Payer: COMMERCIAL

## 2023-04-19 ENCOUNTER — NURSE TRIAGE (OUTPATIENT)
Dept: INTERNAL MEDICINE | Facility: CLINIC | Age: 63
End: 2023-04-19
Payer: COMMERCIAL

## 2023-04-19 DIAGNOSIS — I10 HYPERTENSION, UNSPECIFIED TYPE: Primary | ICD-10-CM

## 2023-04-19 RX ORDER — HYDROCHLOROTHIAZIDE 12.5 MG/1
12.5 TABLET ORAL DAILY
Qty: 30 TABLET | Refills: 11 | Status: SHIPPED | OUTPATIENT
Start: 2023-04-19 | End: 2023-05-18

## 2023-04-19 NOTE — TELEPHONE ENCOUNTER
Pt describes minimal LE edema that doesn't stay and is consistent with side effects of Amlodipine use. NO SOB issues described to suggest  CHF issue.   would like to see blood pressure closer to  130/80 or less more consistently   Pt to continue Amlodipine/ benazepril 10/20mg capsule once daily for now and add hydrochlorathiazide 12.5mg tab, 1 tab daily for blood pressure. This should both help to lower blood pressure further and help the swelling some too. Rx sent to pt's [facility]   pharmacy  Pt to also try and limit intake of sodium  In diet as salt intake cans also contribute to edema issue. Goal  Is intake less than 2,300mg sodium and, if able, even less than 2,000mg per day.  Pt to have a nonfasting BMP lab done at any  clinic lab in 2 weeks to recheck electrolytes and kidney function with use of hydrochlorathiazide and then send me an update re: BPs in a couple weeks in a [employer] message. Check blood pressure in AM and PM for 3 days prior to the update.  Assist pt with scheduling lab appt. May be done at [facility] or [place] where closer to pt's home

## 2023-04-19 NOTE — TELEPHONE ENCOUNTER
"Patient calling back regarding mychart encounter on 4/19/23.    Reporting lower extremity edema and elevated blood pressure     ONSET: 1-2 weeks ago   LOCATION: bilateral ankle to mid calf \"wherever my sock is there is an indent after I take my sock off and then it goes away\"  SEVERITY: mild - resolves after elevating   REDNESS: denies   PAIN: denies   FEVER: denies   CAUSE: unsure   MEDICAL HISTORY: denies history of heart failure, kidney disease, liver failure, or cancer  RECURRENT SYMPTOM: denies   OTHER SYMPTOMS: denies chest pain or difficulty breathing     BLOOD PRESSURE: yesterday 142/82, the day prior 143/88  ONSET: has been mildly elevated for a while per patient - states PCP is aware   HOW: automatic monitor at home   HISTORY: HTN since TIA   MEDICATIONS: amlodipine, metoprolol   OTHER SYMPTOMS: denies headache, chest pain, blurred vision, difficulty breathing, weakness    Triaged per [facility] protocol, patient to be seen in office within 3 days. Writer offered to schedule patient for office visit. No available appointments with PCP within 3 days and patient states he would prefer not to see a different provider.    Routing to PCP to please advise if able to work patient in for appointment or if other recommendation?    Callback [phone] - ok to leave detailed VM     Advised that patient call back with new or worsening symptoms in the interim. Reviewed red flag symptoms that warrant emergent eval chest pain difficulty breathing etc. Patient expressed verbal understanding and is agreeable.     Kacy Alejo RN  [facility]    Reason for Disposition    MILD swelling of both ankles (i.e., pedal edema) AND new-onset or worsening    Systolic BP >= 130 OR Diastolic >= 80, and is taking BP medications    Additional Information    Negative: Sounds like a life-threatening emergency to the triager    Negative: Chest pain    Negative: Small area of swelling and followed an insect bite to the " area    Negative: Followed a knee injury    Negative: Ankle or foot injury    Negative: Pregnant with leg swelling or edema    Negative: Difficulty breathing at rest    Negative: Entire foot is cool or blue in comparison to other side    Negative: SEVERE swelling (e.g., swelling extends above knee, entire leg is swollen, weeping fluid)    Negative: Thigh or calf pain and only 1 side and present > 1 hour    Negative: Thigh, calf, or ankle swelling in only one leg    Negative: Thigh, calf, or ankle swelling in both legs, but one side is definitely more swollen (Exception: longstanding difference between legs)    Negative: Cast on leg or ankle and has increasing pain    Negative: Can't walk or can barely stand (new-onset)    Negative: Fever and red area (or area very tender to touch)    Negative: Patient sounds very sick or weak to the triager    Negative: Swelling of face, arm or hands  (Exception: Slight puffiness of fingers during hot weather.)    Negative: Pregnant 20 or more weeks and sudden weight gain (i.e., > 2 lbs, 1 kg in one week)    Negative: MODERATE swelling of both ankles (e.g., swelling extends up to the knees) AND new-onset or worsening    Negative: Difficulty breathing with exertion AND worsening or new-onset    Negative: Looks like a boil, infected sore, deep ulcer, or other infected rash (spreading redness, pus)    Negative: Patient wants to be seen    Negative: Sounds like a life-threatening emergency to the triager    Negative: Symptom is main concern (e.g., headache, chest pain)    Negative: Low blood pressure is main concern    Negative: Systolic BP >= 160 OR Diastolic >= 100, and any cardiac or neurologic symptoms (e.g., chest pain, difficulty breathing, unsteady gait, blurred vision)    Negative: Pregnant 20 or more weeks (or postpartum < 6 weeks) with new hand or face swelling    Negative: Pregnant 20 or more weeks (or postpartum < 6 weeks) and Systolic BP >= 160 OR Diastolic >= 100     Negative: Patient sounds very sick or weak to the triager    Negative: Systolic BP >= 200 OR Diastolic >= 120 and having NO cardiac or neurologic symptoms    Negative: Pregnant 20 or more weeks (or postpartum < 6 weeks) with Systolic BP >= 140 OR Diastolic >= 90    Negative: Systolic BP >= 180 OR Diastolic >= 110, and missed most recent dose of blood pressure medication    Negative: Systolic BP >= 180 OR Diastolic >= 110    Negative: Patient wants to be seen    Negative: Ran out of BP medications    Negative: Taking BP medications and feels is having side effects (e.g., impotence, cough, dizziness)    Negative: Systolic BP >= 160 OR Diastolic >= 100    Negative: Systolic BP >= 130 OR Diastolic >= 80, and pregnant    Protocols used: LEG SWELLING AND EDEMA-A-OH, BLOOD PRESSURE - HIGH-A-OH

## 2023-04-20 ENCOUNTER — MYC MEDICAL ADVICE (OUTPATIENT)
Dept: INTERNAL MEDICINE | Facility: CLINIC | Age: 63
End: 2023-04-20
Payer: COMMERCIAL

## 2023-04-20 NOTE — TELEPHONE ENCOUNTER
Received a call back from the patient. Relayed message from the provider. Assisted patient in scheduling a lab only appointment.     Appointments in Next Year    May 09, 2023 11:45 AM  [facility]  [facility] ([facility] ) [phone]        Patient expressed understanding and had no additional questions at this time.    Azucena Hawk RN

## 2023-04-28 NOTE — TELEPHONE ENCOUNTER
"Called and spoke to pt about MC messages.     Per pt- yesterday 4/27/23 when his BP was in the am 149/87   pm 152/89 he was feeling anxious about his BP.     Pt stated he doesn't understand why his BP is \"all over the place\".   Pt stated he has not missed any doses of meds and takes them at the same time everyday.     Pt stated he feels perfectly fine today.     His tingling in his legs/footthat he mentioned is mainly in the right foot and usually after work (he is on his feet all day). Pt stated it is \"not bad\" but enough for him to notice it. Pt stated this is not new and has been happening since his TIA.       Pt is wondering if Dr Boykin knows why his BP would all of a sudden jump up after the TIA? And why it is \"all over the place\"?     Routing to PCP to review and advise.     Please send pt a MC message with providers response.         "

## 2023-05-09 ENCOUNTER — LAB (OUTPATIENT)
Dept: LAB | Facility: CLINIC | Age: 63
End: 2023-05-09
Payer: COMMERCIAL

## 2023-05-09 DIAGNOSIS — I10 HYPERTENSION, UNSPECIFIED TYPE: ICD-10-CM

## 2023-05-09 LAB
ANION GAP SERPL CALCULATED.3IONS-SCNC: 10 MMOL/L (ref 7–15)
BUN SERPL-MCNC: 11.2 MG/DL (ref 8–23)
CALCIUM SERPL-MCNC: 9.7 MG/DL (ref 8.8–10.2)
CHLORIDE SERPL-SCNC: 97 MMOL/L (ref 98–107)
CREAT SERPL-MCNC: 1.14 MG/DL (ref 0.67–1.17)
DEPRECATED HCO3 PLAS-SCNC: 27 MMOL/L (ref 22–29)
GFR SERPL CREATININE-BSD FRML MDRD: 71 ML/MIN/1.73M2
GLUCOSE SERPL-MCNC: 85 MG/DL (ref 70–99)
POTASSIUM SERPL-SCNC: 5.1 MMOL/L (ref 3.4–5.3)
SODIUM SERPL-SCNC: 134 MMOL/L (ref 136–145)

## 2023-05-09 PROCEDURE — 80048 BASIC METABOLIC PNL TOTAL CA: CPT

## 2023-05-09 PROCEDURE — 36415 COLL VENOUS BLD VENIPUNCTURE: CPT

## 2023-05-12 DIAGNOSIS — I10 HYPERTENSION, UNSPECIFIED TYPE: ICD-10-CM

## 2023-05-15 NOTE — TELEPHONE ENCOUNTER
Pt called the clinic to follow up on this.     Pt is asking for recommendations from 4/28/23 MC messages and RNs message from 4/28/23.     Pt is also asking for the interpretation from the blood work done on 5/9/23.     See MC message for more details.     Pt is asking for a MC message back with providers response.     Routing to PCP to review and advise.

## 2023-05-18 ENCOUNTER — MYC MEDICAL ADVICE (OUTPATIENT)
Dept: INTERNAL MEDICINE | Facility: CLINIC | Age: 63
End: 2023-05-18
Payer: COMMERCIAL

## 2023-05-18 DIAGNOSIS — I10 HYPERTENSION, UNSPECIFIED TYPE: Primary | ICD-10-CM

## 2023-05-18 RX ORDER — HYDROCHLOROTHIAZIDE 25 MG/1
25 TABLET ORAL DAILY
Qty: 90 TABLET | Refills: 3 | Status: SHIPPED | OUTPATIENT
Start: 2023-05-18 | End: 2024-02-28

## 2023-05-19 RX ORDER — BENAZEPRIL HYDROCHLORIDE 40 MG/1
40 TABLET ORAL DAILY
Qty: 30 TABLET | Refills: 11 | Status: SHIPPED | OUTPATIENT
Start: 2023-05-19 | End: 2023-06-08

## 2023-05-19 RX ORDER — AMLODIPINE BESYLATE 5 MG/1
5 TABLET ORAL DAILY
Qty: 30 TABLET | Refills: 11 | Status: SHIPPED | OUTPATIENT
Start: 2023-05-19 | End: 2023-06-08

## 2023-05-19 NOTE — TELEPHONE ENCOUNTER
Please see mychart from patient and advise appropriate course of action.    Azucena Hawk RN  [facility] Triage Nurse

## 2023-05-20 NOTE — TELEPHONE ENCOUNTER
Spoke with patient by phone a.m. 5/19/2023 and reviewed most recent lab results and blood pressure control plan.  See response to [employer] message 5/18/2023 for details

## 2023-05-29 ENCOUNTER — MYC MEDICAL ADVICE (OUTPATIENT)
Dept: INTERNAL MEDICINE | Facility: CLINIC | Age: 63
End: 2023-05-29
Payer: COMMERCIAL

## 2023-05-30 NOTE — TELEPHONE ENCOUNTER
Please see 5/29/23 MyChart and advise.     Bryanna Villalba RN    [facility] Triage Nurse

## 2023-06-07 ENCOUNTER — MYC MEDICAL ADVICE (OUTPATIENT)
Dept: INTERNAL MEDICINE | Facility: CLINIC | Age: 63
End: 2023-06-07
Payer: COMMERCIAL

## 2023-06-07 DIAGNOSIS — I77.1 ARTERIAL STENOSIS (H): ICD-10-CM

## 2023-06-07 DIAGNOSIS — Z86.73 HISTORY OF TIA (TRANSIENT ISCHEMIC ATTACK) AND STROKE: Primary | ICD-10-CM

## 2023-06-07 DIAGNOSIS — Q25.40 AORTIC ARCH ANOMALY: ICD-10-CM

## 2023-06-07 DIAGNOSIS — J31.0 CHRONIC RHINITIS: ICD-10-CM

## 2023-06-07 DIAGNOSIS — I10 HYPERTENSION, UNSPECIFIED TYPE: ICD-10-CM

## 2023-06-07 NOTE — TELEPHONE ENCOUNTER
See MyChart from patient needing provider review.  Please write back to pt or advise if clinic follow up needed.     Alondra Yoder, MARIA T  [facility] RN Triage Team

## 2023-06-08 ENCOUNTER — NURSE TRIAGE (OUTPATIENT)
Dept: INTERNAL MEDICINE | Facility: CLINIC | Age: 63
End: 2023-06-08
Payer: COMMERCIAL

## 2023-06-08 RX ORDER — AMLODIPINE BESYLATE 5 MG/1
5 TABLET ORAL AT BEDTIME
Qty: 30 TABLET | Refills: 11
Start: 2023-06-08 | End: 2023-06-16

## 2023-06-08 RX ORDER — BENAZEPRIL HYDROCHLORIDE 40 MG/1
40 TABLET ORAL AT BEDTIME
Qty: 30 TABLET | Refills: 11
Start: 2023-06-08 | End: 2023-06-16

## 2023-06-08 NOTE — TELEPHONE ENCOUNTER
TO PCP    S-(situation): Chronic coughing, now pain in right chest area. Multiple complaints as My Charts have not been answered as well.     B-(background): Chronic cough from drainage and now right sided chest pain with each cough, dissipates after coughing stops.     A-(assessment): chronic cough with thick white drainage, now pain in right chest area with coughing.     R-(recommendations): Advised mucinex and see ENT for chronic drainage.     Please advise.  ENT referral pended for your Review VS APPT?      Carrie Degroot RN on 6/8/2023 at 9:39 AM        Reason for Disposition    Chest pain(s) lasting a few seconds from coughing    Additional Information    Negative: SEVERE difficulty breathing (e.g., struggling for each breath, speaks in single words)    Negative: Passed out (i.e., fainted, collapsed and was not responding)    Negative: Difficult to awaken or acting confused (e.g., disoriented, slurred speech)    Negative: Shock suspected (e.g., cold/pale/clammy skin, too weak to stand, low BP, rapid pulse)    Negative: Chest pain lasting longer than 5 minutes and ANY of the following:* Over 44 years old* Over 30 years old and at least one cardiac risk factor (e.g., diabetes mellitus, high blood pressure, high cholesterol, smoker, or strong family history of heart disease)* History of heart disease (i.e., angina, heart attack, heart failure, bypass surgery, takes nitroglycerin)* Pain is crushing, pressure-like, or heavy    Negative: Heart beating < 50 beats per minute OR > 140 beats per minute    Negative: Visible sweat on face or sweat dripping down face    Negative: Sounds like a life-threatening emergency to the triager    Negative: Followed an injury to chest    Negative: SEVERE chest pain    Negative: Pain also in shoulder(s) or arm(s) or jaw    Negative: Difficulty breathing    Negative: Cocaine use within last 3 days    Negative: Major surgery in the past month    Negative: Hip or leg fracture (broken  "bone) in past month (or had cast on leg or ankle in past month)    Negative: Illness requiring prolonged bedrest in past month (e.g., immobilization, long hospital stay)    Negative: Long-distance travel in past month (e.g., car, bus, train, plane; with trip lasting 6 or more hours)    Negative: History of prior 'blood clot' in leg or lungs (i.e., deep vein thrombosis, pulmonary embolism)    Negative: History of inherited increased risk of blood clots (e.g., Factor 5 Leiden, Anti-thrombin 3, Protein C or Protein S deficiency, Prothrombin mutation)    Negative: Cancer treatment in the past two months (or has cancer now)    Negative: Heart beating irregularly or very rapidly    Negative: Chest pain lasting longer than 5 minutes and occurred in last 3 days (72 hours) (Exception: feels exactly the same as previously diagnosed heartburn and has accompanying sour taste in mouth)    Negative: Chest pain or 'angina' comes and goes and is happening more often (increasing in frequency) or getting worse (increasing in severity) (Exception: chest pains that last only a few seconds)    Negative: Dizziness or lightheadedness    Negative: Coughing up blood    Negative: Patient sounds very sick or weak to the triager    Negative: Patient says chest pain feels exactly the same as previously diagnosed 'heartburn' and describes burning in chest and accompanying sour taste in mouth    Negative: All other patients with chest pain (Exception: fleeting chest pain lasting a few seconds)    Negative: Rash in same area as pain (may be described as 'small blisters')    Negative: Chest pain(s) lasting a few seconds persists > 3 days    Negative: Fever > 100.4 F (38.0 C)    Negative: Patient wants to be seen    Answer Assessment - Initial Assessment Questions  1. LOCATION: \"Where does it hurt?\"        Right side, upper rib cage by nipple area  2. RADIATION: \"Does the pain go anywhere else?\" (e.g., into neck, jaw, arms, back)      No  3. ONSET: " "\"When did the chest pain begin?\" (Minutes, hours or days)       A couple of months ago  4. PATTERN \"Does the pain come and go, or has it been constant since it started?\"  \"Does it get worse with exertion?\"       Yes, only when I cough. I coughed really hard and its been since then  5. DURATION: \"How long does it last\" (e.g., seconds, minutes, hours)      Just if I cough, then it's gone immediately  6. SEVERITY: \"How bad is the pain?\"  (e.g., Scale 1-10; mild, moderate, or severe)     - MILD (1-3): doesn't interfere with normal activities      - MODERATE (4-7): interferes with normal activities or awakens from sleep     - SEVERE (8-10): excruciating pain, unable to do any normal activities        5/10 at the most  7. CARDIAC RISK FACTORS: \"Do you have any history of heart problems or risk factors for heart disease?\" (e.g., angina, prior heart attack; diabetes, high blood pressure, high cholesterol, smoker, or strong family history of heart disease)      No  8. PULMONARY RISK FACTORS: \"Do you have any history of lung disease?\"  (e.g., blood clots in lung, asthma, emphysema, birth control pills)      No  9. CAUSE: \"What do you think is causing the chest pain?\"      I'm not sure  10. OTHER SYMPTOMS: \"Do you have any other symptoms?\" (e.g., dizziness, nausea, vomiting, sweating, fever, difficulty breathing, cough)        Cough  11. PREGNANCY: \"Is there any chance you are pregnant?\" \"When was your last menstrual period?\"        N/A    Protocols used: CHEST PAIN-A-OH      "

## 2023-06-09 NOTE — TELEPHONE ENCOUNTER
Spoke with patient earlier  this evening.  Blood pressures noted. Will change  Amlodipine an Benazepril to bedtime. Continur other meds AM to smooth out the BPS and pt will update me in a few weeks. Has some recent post nasal drainage with clear rhinorrhea that has mostly resolved with weather getting warmer. NO shortness of breath or exertional CP. Had some soreness right ant chest near sternal border that coulc point to when coughing and having mild soreness. Not with exertion. That pain has resolved as cough much better. NO change with Claritin or Nasacort. Since sx much better, pt declined trial of Atrovent nasal  Needs f/u CTA head and CT chest for abnormal imaging Dec 2022 at [facility]. NO recurrent TIA sx. Imaging orders and central schedulers will call pt or pt given number to call next Monday if not contact with pt by then.  Pt never saw Neuro in follow-up after hospital stay Dec 2022

## 2023-06-13 NOTE — TELEPHONE ENCOUNTER
TO PCP    Pt called requesting ipratropium nasal spray as discussed on 6/8 with PCP on phone.     Pharmacy pended, please place med orders if appropriate.    CAROLYN CHRISTENSEN RN on 6/13/2023 at 11:19 AM

## 2023-06-15 RX ORDER — IPRATROPIUM BROMIDE 42 UG/1
1-2 SPRAY, METERED NASAL 2 TIMES DAILY
Qty: 15 ML | Refills: 11 | Status: SHIPPED | OUTPATIENT
Start: 2023-06-15

## 2023-06-16 DIAGNOSIS — I10 HYPERTENSION, UNSPECIFIED TYPE: ICD-10-CM

## 2023-06-16 DIAGNOSIS — F41.9 ANXIETY: ICD-10-CM

## 2023-06-16 RX ORDER — METOPROLOL SUCCINATE 50 MG/1
TABLET, EXTENDED RELEASE ORAL
Qty: 45 TABLET | Refills: 3 | Status: SHIPPED | OUTPATIENT
Start: 2023-06-16 | End: 2024-04-10

## 2023-06-16 RX ORDER — AMLODIPINE BESYLATE 5 MG/1
5 TABLET ORAL AT BEDTIME
Qty: 90 TABLET | Refills: 3 | Status: SHIPPED | OUTPATIENT
Start: 2023-06-16 | End: 2023-08-10 | Stop reason: ALTCHOICE

## 2023-06-16 RX ORDER — BENAZEPRIL HYDROCHLORIDE 40 MG/1
40 TABLET ORAL AT BEDTIME
Qty: 90 TABLET | Refills: 3 | Status: SHIPPED | OUTPATIENT
Start: 2023-06-16 | End: 2023-08-10 | Stop reason: ALTCHOICE

## 2023-06-20 ENCOUNTER — ANCILLARY PROCEDURE (OUTPATIENT)
Dept: CT IMAGING | Facility: CLINIC | Age: 63
End: 2023-06-20
Attending: INTERNAL MEDICINE
Payer: COMMERCIAL

## 2023-06-20 DIAGNOSIS — Q25.40 AORTIC ARCH ANOMALY: ICD-10-CM

## 2023-06-20 DIAGNOSIS — Z86.73 HISTORY OF TIA (TRANSIENT ISCHEMIC ATTACK) AND STROKE: ICD-10-CM

## 2023-06-20 DIAGNOSIS — I77.1 ARTERIAL STENOSIS (H): ICD-10-CM

## 2023-06-20 PROCEDURE — 250N000011 HC RX IP 250 OP 636: Performed by: INTERNAL MEDICINE

## 2023-06-20 PROCEDURE — 70496 CT ANGIOGRAPHY HEAD: CPT

## 2023-06-20 PROCEDURE — 71260 CT THORAX DX C+: CPT

## 2023-06-20 RX ORDER — IOPAMIDOL 755 MG/ML
80 INJECTION, SOLUTION INTRAVASCULAR ONCE
Status: COMPLETED | OUTPATIENT
Start: 2023-06-20 | End: 2023-06-20

## 2023-06-20 RX ADMIN — IOPAMIDOL 80 ML: 755 INJECTION, SOLUTION INTRAVENOUS at 12:56

## 2023-06-26 ENCOUNTER — TELEPHONE (OUTPATIENT)
Dept: INTERNAL MEDICINE | Facility: CLINIC | Age: 63
End: 2023-06-26
Payer: COMMERCIAL

## 2023-06-26 DIAGNOSIS — I71.21 ANEURYSM OF ASCENDING AORTA WITHOUT RUPTURE (H): ICD-10-CM

## 2023-06-26 DIAGNOSIS — G45.9 TIA (TRANSIENT ISCHEMIC ATTACK): Primary | ICD-10-CM

## 2023-06-26 DIAGNOSIS — R93.89 ABNORMAL FINDING ON IMAGING: ICD-10-CM

## 2023-06-26 NOTE — TELEPHONE ENCOUNTER
Pt calling requesting PCP interpretation and  follow up on recent CT scans done 6/20/23. Please advise.    Ok to leave detailed VM on callback    Meredith HERNADEZ RN  [facility] Triage Team

## 2023-06-28 PROBLEM — I71.21 ANEURYSM OF ASCENDING AORTA WITHOUT RUPTURE (H): Status: ACTIVE | Noted: 2023-06-28

## 2023-06-28 NOTE — TELEPHONE ENCOUNTER
See  message to pt. Also was able to later reach pt by phone and discussed. Pt states feeling well now and SBPs in the 110-128 range and DBP < 80. Will await consultations and pt will let me know if doesn't hear from IR by Monday

## 2023-07-11 ENCOUNTER — MYC MEDICAL ADVICE (OUTPATIENT)
Dept: INTERNAL MEDICINE | Facility: CLINIC | Age: 63
End: 2023-07-11
Payer: COMMERCIAL

## 2023-07-11 DIAGNOSIS — Q25.40 ABNORMALITY OF THORACIC AORTA: ICD-10-CM

## 2023-07-11 DIAGNOSIS — I71.21 ANEURYSM OF ASCENDING AORTA WITHOUT RUPTURE (H): ICD-10-CM

## 2023-07-11 DIAGNOSIS — R91.8 PULMONARY NODULES: Primary | ICD-10-CM

## 2023-07-12 ENCOUNTER — TELEPHONE (OUTPATIENT)
Dept: INTERVENTIONAL RADIOLOGY/VASCULAR | Facility: CLINIC | Age: 63
End: 2023-07-12
Payer: COMMERCIAL

## 2023-07-12 NOTE — TELEPHONE ENCOUNTER
Received a call from Dr Boykin today regarding a question on his patient Luis F Gipson's imaging which showed a possible abnormality.     Luis F had a CTA neck done at [facility] on 12/12/22 with the following result    Abutting and adjacent to the proximal descending thoracic aorta is a small nodular density measuring 6 mm concerning for small penetrating ulceration. Alternatively, left lung apex pulmonary nodule abutting the aortic pleura also possible. Recommend follow-up CT chest in 6 months to ensure stability.    The patient then had a CT chest at [facility] on 6/20/23 as recommended with the following;     Stable 6 mm solid nodule abutting the posterior margin of the thoracic aortic arch. A tiny penetrating ulcer remains a differential consideration. Recommend vascular interventional radiology consultation     Dr Boykin put in an IR referral for recommendations on follow up 6/28/23.     IR Dr Cornelia Lorenzo reviewed both reports and images and also reviewed with a Radiology colleague Dr Seo. The nodule is abutting but doesn't appear to be arising from the aorta. The hounsfield units (HU) of the nodule is 52 and of the aorta 118. If the nodule was a penetrating ulcer the HU would most likely be the same as the aorta. It appears like a pulmonary nodule or possible benign nodule.   There has been 6 months of stability in size without a change and probably nothing to worry about.     Recommend following it with a CTA chest in 6 months. If that study shows the nodule unchanged then follow with routine pulmonary nodule screening.     Dr Lorenzo discussed with Dr Boykin today. Per Dr Boykin he was following the recommendation on the CT chest impression and is comfortable with the follow up. The patient does not need to be seen in IR clinic.     Thanks, [facility] Interventional Radiology CNP ([phone]) (phone [phone])

## 2023-07-13 NOTE — TELEPHONE ENCOUNTER
Spoke with Dr Lorenzo earlier today from Vascular IR and she reviewed jose as Dr Irving is out of the office this week and was supposedly going to be reaching out to me to discuss.  She feels imaging shows nodule rather than vascular ulceration but recommended that a CTA could be done in 6 months  to both follow as lung nodule and to also get better view thoracic aortic arch.  Called and spoke with pt and discussed  VIR recommendation to have CTA chest in 6 mos and order placed in chart .  Staff message reminder entered into chart to be sent to PCP 1/20/24 to be sure pt has been contacted to schedule CTA

## 2023-08-07 ENCOUNTER — TELEPHONE (OUTPATIENT)
Dept: INTERNAL MEDICINE | Facility: CLINIC | Age: 63
End: 2023-08-07
Payer: COMMERCIAL

## 2023-08-07 NOTE — TELEPHONE ENCOUNTER
Patient calling clinic stating he would like to discuss his ongoing R sided chest pain (present since end of may/early June) and post nasal drainage with his PCP. Patient declines triage.       Future Appointments 8/7/2023 - 2/3/2024        Date Visit Type Length Department Provider     8/10/2023  9:00 AM OFFICE VISIT 30 min  INTERNAL MEDICINE Ronald Boykin MD    Location Instructions:     [facility] is in the [facility] at [address] [facility] in [place]. This just east of the [address] [place] [address] exit off of [address]. Free parking is available; access the lot from [address] [place] [address] or [facility].               12/15/2023  9:00 AM NEW STROKE 60 min  NEUROLOGY Omar Fatima MD    Location Instructions:     [address] [place] [address], [address] [facility] [id number]

## 2023-08-10 ENCOUNTER — OFFICE VISIT (OUTPATIENT)
Dept: INTERNAL MEDICINE | Facility: CLINIC | Age: 63
End: 2023-08-10
Payer: COMMERCIAL

## 2023-08-10 VITALS
OXYGEN SATURATION: 99 % | WEIGHT: 200.1 LBS | HEIGHT: 70 IN | TEMPERATURE: 98.5 F | SYSTOLIC BLOOD PRESSURE: 126 MMHG | DIASTOLIC BLOOD PRESSURE: 74 MMHG | BODY MASS INDEX: 28.65 KG/M2 | HEART RATE: 92 BPM

## 2023-08-10 DIAGNOSIS — M94.0 COSTOCHONDRITIS: ICD-10-CM

## 2023-08-10 DIAGNOSIS — R05.3 CHRONIC COUGH: ICD-10-CM

## 2023-08-10 DIAGNOSIS — I71.21 ANEURYSM OF ASCENDING AORTA WITHOUT RUPTURE (H): ICD-10-CM

## 2023-08-10 DIAGNOSIS — I10 HYPERTENSION, UNSPECIFIED TYPE: ICD-10-CM

## 2023-08-10 PROCEDURE — 99214 OFFICE O/P EST MOD 30 MIN: CPT | Performed by: INTERNAL MEDICINE

## 2023-08-10 RX ORDER — AMLODIPINE AND VALSARTAN 5; 160 MG/1; MG/1
1 TABLET ORAL DAILY
Qty: 30 TABLET | Refills: 11 | Status: SHIPPED | OUTPATIENT
Start: 2023-08-10 | End: 2024-04-10

## 2023-08-10 NOTE — PATIENT INSTRUCTIONS
Stop plain Amlodipine and Benazepril   Start Amlodipine/Valsartan 5/160mg capsule, 1 capsule daily in PM for blood pressure   If any lightheadedness issues with low blood pressure, then hold hydrochlorothiazide  and let me  know  If cough not better in a couple weeks, then restart  Ipratropium nasal  spray  1-2 spray 3x/day for post nasal drainage  Update me re: cough in 3 weeks  Continue other medications

## 2023-08-10 NOTE — PROGRESS NOTES
ASSESSMENT:   1. Costochondritis  Secondary to intermittent cough.  Mildly reproducible today.  Patient denies pain with pushing on the area currently.  Will try to limit cough which is contributing.  Tylenol as needed    2. Hypertension, unspecified type  Possible ACE inhibitor side effect of cough..  Benazepril and amlodipine and start generic Exforge for blood pressureo cntrol tosee if cough lessens  - amLODIPine-valsartan (EXFORGE) 5-160 MG tablet; Take 1 tablet by mouth daily  Dispense: 30 tablet; Refill: 11    3. Chronic cough   On PPI and denies GERD sx. Lung exam clear. See #2 above. If cough not better in a couple weeks, pt will then restart ipratropium nasal spray and increase frequency to 3 times a day dosing     4. Aneurysm of ascending aorta without rupture (H)   Reviewed finding again on June CT. BP at goal. Will repeat ECHO June 2024. HR at home generally in the 60s per review pt's home vitals checked    PLAN:   Stop plain Amlodipine and Benazepril   Start Amlodipine/Valsartan 5/160mg capsule, 1 capsule daily in PM for blood pressure   If any lightheadedness issues with low blood pressure, then hold hydrochlorothiazide  and let me  know  If cough not better in a couple weeks, then restart  Ipratropium nasal  spray  1-2 spray 3x/day for post nasal drainage  Update me re: cough in 3 weeks  Continue other medications      (Chart documentation was completed, in part, with [employer] voice-recognition software. Even though reviewed, some grammatical, spelling, and word errors may remain.)    Ronald Boykin MD  Internal Medicine Department  [facility]      Hernan Kerns is a 63 year old, presenting for the following health issues:  Chest Pain      History of Present Illness       Reason for visit:  Pain when I cough on the right side of my chest  Symptom onset:  More than a month  Symptoms include:  Already described  Symptom intensity:  Moderate  Symptom progression:   "Staying the same  Had these symptoms before:  No  What makes it worse:  Coughing  What makes it better:  Tylenol    He eats 0-1 servings of fruits and vegetables daily.He consumes 0 sweetened beverage(s) daily.He exercises with enough effort to increase his heart rate 20 to 29 minutes per day.  He exercises with enough effort to increase his heart rate 5 days per week.   He is taking medications regularly.      Most recent lab results reviewed with pt.      Checking blood pressures at home.  Generally range 105-125/70s  Exercising with an elliptical for 45 minutes 3-4 times per week.  No chest pain or shortness of breath with exercise.  Chronic intermittent cough for months.  Occurs generally with clear postnasal drainage.  Drainage has not changed with antihistamine therapy.  Drainage had stopped for a while with trial of ipratropium nasal spray but then came back.  Patient discontinued the medication.  Was using twice a day at that time.  No sinus pain or discolored nasal drainage.  CT chest June 2023 again reviewed with patient.  Cough was present at that time and airways clear on CT  .  Patient is coughing when he gets some discomfort right sternal border at 1 particular rib joint that he is able to point directly to with his fingers.  After coughing stops, then pain will go away.  Patient denies any chest wall pain currently.  Cough is sometimes to the point that he describes it as a \"violent\" cough.  Not coughing today       Additional ROS:   Constitutional, HEENT, Cardiovascular, Pulmonary, GI and , Neuro, MSK and Psych review of systems/symptoms are otherwise negative or unchanged from previous, except as noted above.      OBJECTIVE:  /74   Pulse 92   Temp 98.5  F (36.9  C) (Oral)   Ht 1.778 m (5' 10\")   Wt 90.8 kg (200 lb 1.6 oz)   SpO2 99%   BMI 28.71 kg/m     Estimated body mass index is 28.71 kg/m  as calculated from the following:    Height as of this encounter: 1.778 m (5' 10\").    Weight " as of this encounter: 90.8 kg (200 lb 1.6 oz).    HENT: ear canals and TM's normal and nose and mouth without ulcers or lesions.  Nasal mucosa edematous.  Sinuses nontender palpation  Neck: no adenopathy. Thyroid normal to palpation. No bruits  Pulm: Lungs clear to auscultation   CV: Regular rates and rhythm  GI: Soft, nontender, Normal active bowel sounds    MSK: Mild tenderness palpation right sternal border approximately 6th rib joint area.

## 2023-08-30 ENCOUNTER — MYC MEDICAL ADVICE (OUTPATIENT)
Dept: INTERNAL MEDICINE | Facility: CLINIC | Age: 63
End: 2023-08-30
Payer: COMMERCIAL

## 2023-08-30 NOTE — TELEPHONE ENCOUNTER
See MyChart from patient needing provider review.   Please write back directly to pt or advise if clinic follow up needed.     Molly REVELES, RN  [facility] RN Triage Team

## 2023-09-01 DIAGNOSIS — I10 HYPERTENSION, UNSPECIFIED TYPE: ICD-10-CM

## 2023-09-01 RX ORDER — AMLODIPINE AND VALSARTAN 5; 160 MG/1; MG/1
1 TABLET ORAL DAILY
Qty: 30 TABLET | Refills: 11 | OUTPATIENT
Start: 2023-09-01

## 2023-11-29 DIAGNOSIS — K21.9 GASTROESOPHAGEAL REFLUX DISEASE WITHOUT ESOPHAGITIS: ICD-10-CM

## 2023-11-29 DIAGNOSIS — Z13.6 CARDIOVASCULAR SCREENING; LDL GOAL LESS THAN 160: Primary | ICD-10-CM

## 2023-11-29 RX ORDER — ASPIRIN 81 MG/1
81 TABLET, COATED ORAL DAILY
Qty: 30 TABLET | Refills: 0 | Status: SHIPPED | OUTPATIENT
Start: 2023-11-29 | End: 2023-12-27

## 2023-11-29 RX ORDER — PANTOPRAZOLE SODIUM 40 MG/1
40 TABLET, DELAYED RELEASE ORAL DAILY
Qty: 90 TABLET | Refills: 3 | Status: SHIPPED | OUTPATIENT
Start: 2023-11-29 | End: 2024-04-10

## 2023-12-14 ENCOUNTER — TELEPHONE (OUTPATIENT)
Dept: NEUROLOGY | Facility: CLINIC | Age: 63
End: 2023-12-14
Payer: COMMERCIAL

## 2023-12-14 NOTE — PROGRESS NOTES
INITIAL NEUROLOGY CONSULTATION    DATE OF VISIT: 12/15/2023  CLINIC LOCATION: [facility]  MRN: [id number]  PATIENT NAME: Luis F Gipson  YOB: 1960    REASON FOR VISIT: No chief complaint on file.    HISTORY OF PRESENT ILLNESS:                                                    Mr. Luis F Gipson is 63 year old right handed male patient with past medical history of ascending aortic aneurysm, anxiety, and headaches, who was seen today for TIA.    Per patient's report, ***.  He is on topiramate and sumatriptan for migraine management.    Laboratory evaluation from May 2023 includes unremarkable BMP.  Latest LDL is from July 2021 (92).  Hemoglobin A1C was 5.1 in December 2022 (according to [facility]).  LDL at that time was 46.    Brain MRI without contrast from 12/12/2022 menstruated mild cerebral volume loss and chronic microvascular ischemic changes, but no acute findings.   Head and neck CTA demonstrated right P2 segment severe stenosis and right supraclinoid ICA small 2 mm inferior outpouching with differential including infundibulum versus small aneurysm.  Neck CTA was unrevealing for stenosis, occlusion, or dissection.  Echocardiogram at that time was negative for PFO or intracardiac thrombus.  PAST MEDICAL/SURGICAL HISTORY:                                                    I personally reviewed patient's past medical and surgical history with the patient at today's visit.  MEDICATIONS:                                                    I personally reviewed patient's medications and allergies with the patient at today's visit.  ALLERGIES:                                                    No Known Allergies  EXAM:                                                    VITAL SIGNS:   There were no vitals taken for this visit.  Mini-Cog Assessment:       General: pt is in NAD, cooperative.  Skin: normal turgor, moist mucous membranes, no lesions/rashes  noticed.  HEENT: ATNC, EOMI, PERRL, white sclera, normal conjunctiva, no nystagmus or ptosis. No carotid bruits bilaterally.  Respiratory: lung sounds clear to auscultation bilaterally, no crackles, wheezes, rhonchi. Symmetric lung excursion, no accessory respiratory muscle use.  Cardiovascular: normal S1/S2, no murmurs/rubs/gallops.   Abdomen: Not distended.  : deferred.    Neurological:  Mental: alert, follows commands,  /5 with ***/3 on memory recall, no aphasia or dysarthria. Fund of knowledge is {MYAPPROPRIATE:427660}  Cranial Nerves:  CN II: visual acuity - able to accurately count fingers with each eye. Visual fields intact, fundi: discs sharp, no papilledema and normal vessels bilaterally.  CN III, IV, VI: EOM intact, pupils equal and reactive  CN V: facial sensation nl  CN VII: face symmetric, no facial droop  CN VIII: hearing normal  CN IX: palate elevation symmetric, uvula at midline  CN XI SCM normal, shoulder shrug nl  CN XII: tongue midline  Motor: Strength: 5/5 in all major groups of all extremities. Normal tone. No abnormal movements. No pronator drift b/l.  Reflexes: Triceps, biceps, brachioradialis, patellar, and achilles reflexes normal and symmetric. No clonus noted. Toes are down-going b/l.   Sensory: light touch, pinprick, and vibration intact. Romberg: negative.  Coordination: FNF and heel-shin tests intact b/l.   Gait:  Normal, able to tandem walk *** without difficulty.  DATA:   LABS/EEG/IMAGING/OTHER STUDIES: I reviewed pertinent medical records, as detailed in the history of present illness.  ASSESSMENT AND PLAN:      ASSESSMENT: Luis F Gipson is a 63 year old male patient with listed above past medical history, who presents with ***.    We had a detailed discussion with the patient regarding his presenting complaints.  The neurological exam today is ***.    DIAGNOSES:  No diagnosis found.  PLAN: There are no Patient Instructions on file for this visit.    Total Time: *** minutes  spent on the date of the encounter doing chart review, history and exam, documentation and further activities per the note.    Omar Fatima MD  [facility] Neurology  (Chart documentation was completed in part with Dragon voice-recognition software. Even though reviewed, some grammatical, spelling, and word errors may remain.)

## 2023-12-15 ENCOUNTER — OFFICE VISIT (OUTPATIENT)
Dept: NEUROLOGY | Facility: CLINIC | Age: 63
End: 2023-12-15
Attending: INTERNAL MEDICINE
Payer: COMMERCIAL

## 2023-12-15 VITALS
DIASTOLIC BLOOD PRESSURE: 78 MMHG | HEIGHT: 70 IN | OXYGEN SATURATION: 99 % | SYSTOLIC BLOOD PRESSURE: 125 MMHG | HEART RATE: 86 BPM | WEIGHT: 201.8 LBS | BODY MASS INDEX: 28.89 KG/M2

## 2023-12-15 DIAGNOSIS — G45.9 TIA (TRANSIENT ISCHEMIC ATTACK): Primary | ICD-10-CM

## 2023-12-15 PROCEDURE — 99204 OFFICE O/P NEW MOD 45 MIN: CPT | Performed by: PSYCHIATRY & NEUROLOGY

## 2023-12-15 RX ORDER — CHOLECALCIFEROL (VITAMIN D3) 50 MCG
1 TABLET ORAL DAILY
COMMUNITY

## 2023-12-15 RX ORDER — MULTIPLE VITAMINS W/ MINERALS TAB 9MG-400MCG
1 TAB ORAL DAILY
COMMUNITY

## 2023-12-15 NOTE — PATIENT INSTRUCTIONS
AFTER VISIT SUMMARY (AVS):    At today's visit we thoroughly discussed current symptoms, results of stroke evaluation, symptoms and signs of stroke, secondary stroke prevention measures, and the plan.    Symptoms and signs of stroke were discussed.  You should call 911 with any SUDDEN onset of weakness, vision loss, speech problems, numbness, or severe headache of unknown cause.    For stroke prevention:  -Continue 81 mg of aspirin daily unless your heart monitor demonstrates atrial fibrillation.  In such case you would need stronger medication that we will discuss after heart monitoring  -Long-term blood pressure goal is less than 130/85  -Long-term LDL goal is 40-70  -Long-term goal of hemoglobin A1C is less than 7.0  -Low-salt low-fat diet  -Regular aerobic exercise 30 minutes 3-4 times per week    Next follow-up appointment is on as needed basis (based on heart monitoring results).    Please do not hesitate to call me with any questions or concerns.    Thanks.

## 2023-12-15 NOTE — PROGRESS NOTES
"Luis F Gipson is a 63 year old male who presents for:  Chief Complaint   Patient presents with    Consult     Follow Up on TIA         Initial Vitals:  /78 (BP Location: Right arm, Patient Position: Sitting, Cuff Size: Adult Regular)   Pulse 86   Ht 1.778 m (5' 10\")   Wt 91.5 kg (201 lb 12.8 oz)   SpO2 99%   BMI 28.96 kg/m   Estimated body mass index is 28.96 kg/m  as calculated from the following:    Height as of this encounter: 1.778 m (5' 10\").    Weight as of this encounter: 91.5 kg (201 lb 12.8 oz).. Body surface area is 2.13 meters squared. BP completed using cuff size: charli Jorgensen    "

## 2023-12-15 NOTE — PROGRESS NOTES
INITIAL NEUROLOGY CONSULTATION    DATE OF VISIT: 12/15/2023  CLINIC LOCATION: [facility]  MRN: [id number]  PATIENT NAME: Luis F Gipson  YOB: 1960    REASON FOR VISIT:   Chief Complaint   Patient presents with    Consult     Follow Up on TIA      HISTORY OF PRESENT ILLNESS:                                                    Mr. Luis F Gipson is 63 year old right handed male patient with past medical history of ascending aortic aneurysm, anxiety, and headaches, who was seen today for TIA.    Per patient's report, approximately a year ago on 12/11/2022 he experienced transient episode of left-sided numbness that affected his face, torso, and upper and lower limbs.  It lasted slightly less than 10 minutes and spontaneously resolved.  He was evaluated at [facility] and stayed overnight.  See evaluation below.  Was told that he had TIA.  He is on topiramate and sumatriptan for migraine management, but since this event has not had any migraines.  On 81 mg of daily aspirin along with blood pressure medications.  He brought me his blood pressure diary, which we reviewed.  His blood pressure readings are under 140/90.    Laboratory evaluation from May 2023 includes unremarkable BMP.  Latest LDL in our system is from July 2021 (92).  Hemoglobin A1C was 5.1 in December 2022 (according to [facility]).  LDL at that time was 46.    CTA from 6/20/2023 demonstrated moderate narrowing in the proximal left P2 segment and moderate narrowing in the distal right P2 segment along with mild narrowing in the right P2 segment elsewhere.  In addition, tiny 1 mm outpouching at the origin of the right posterior communicating artery, unchanged from previous study and favored to represent vascular infundibulum, was noted.    Brain MRI without contrast from 12/12/2022 demonstrated mild cerebral volume loss and chronic microvascular ischemic changes, but no acute findings.    Head and neck CTA demonstrated right P2 segment severe stenosis and right supraclinoid ICA small 2 mm inferior outpouching with differential including infundibulum versus small aneurysm.  Neck CTA was unrevealing for stenosis, occlusion, or dissection.  I personally reviewed and independently interpreted all images.    Echocardiogram at that time was negative for PFO or intracardiac thrombus.  I do not see prolonged heart monitoring.  PAST MEDICAL/SURGICAL HISTORY:                                                    I personally reviewed patient's past medical and surgical history with the patient at today's visit.  MEDICATIONS:                                                    I personally reviewed patient's medications and allergies with the patient at today's visit.  ALLERGIES:                                                    No Known Allergies  EXAM:                                                    VITAL SIGNS:   /78 (BP Location: Right arm, Patient Position: Sitting, Cuff Size: Adult Regular)   Pulse 86   SpO2 99%   Mini-Cog Assessment:  Mini Cog Assessment  Clock Draw Score: 2 Normal  3 Item Recall: 3 objects recalled  Mini Cog Total Score: 5  Administered by: : Mari ROGERS    General: pt is in NAD, cooperative.  Skin: normal turgor, moist mucous membranes, no lesions/rashes noticed.  HEENT: ATNC, EOMI, PERRL, white sclera, normal conjunctiva, no nystagmus or ptosis. No carotid bruits bilaterally.  Respiratory: lung sounds clear to auscultation bilaterally, no crackles, wheezes, rhonchi. Symmetric lung excursion, no accessory respiratory muscle use.  Cardiovascular: normal S1/S2, no murmurs/rubs/gallops.   Abdomen: Not distended.  : deferred.    Neurological:  Mental: alert, follows commands, Mini Cog Total Score: 5/5 with 3/3 on memory recall, no aphasia or dysarthria. Fund of knowledge is appropriate for age.  Cranial Nerves:  CN II: visual acuity - able to accurately count fingers with each eye.  Visual fields intact, fundi: discs sharp, no papilledema and normal vessels bilaterally.  CN III, IV, VI: EOM intact, pupils equal and reactive  CN V: facial sensation nl  CN VII: face symmetric, no facial droop  CN VIII: hearing normal  CN IX: palate elevation symmetric, uvula at midline  CN XI SCM normal, shoulder shrug nl  CN XII: tongue midline  Motor: Strength: 5/5 in all major groups of all extremities. Normal tone. No abnormal movements. No pronator drift b/l.  Reflexes: Triceps, biceps, brachioradialis, patellar, and achilles reflexes normal and symmetric. No clonus noted. Toes are down-going b/l.   Sensory: light touch, pinprick, and vibration intact. Romberg: negative.  Coordination: FNF and heel-shin tests intact b/l.   Gait:  Normal casual walk.  DATA:   LABS/EEG/IMAGING/OTHER STUDIES: I reviewed pertinent medical records, as detailed in the history of present illness.  ASSESSMENT AND PLAN:      ASSESSMENT: Luis F Gipson is a 63 year old male patient with listed above past medical history, who presents after an episode of left-sided transient sensory symptoms in December 2022, consistent with TIA, without recurrence.    We had a detailed discussion with the patient regarding his presenting complaints.  The neurological exam today is non-focal.  We reviewed his TIA workup as well as repeat head CTA that demonstrated 1 mm outpouching at the origin of the right posterior communicating artery, likely representing infundibulum.  I do not believe that additional future imaging is needed for this finding.    Regarding his TIA, most of the workup was done, except 14-day cardiac monitoring, which I ordered to evaluate for atrial fibrillation.  Meanwhile, the patient was advised to continue aspirin, but we discussed that if atrial fibrillation is found he will need anticoagulation under the guidance of his primary care provider.    DIAGNOSES:    ICD-10-CM    1. TIA (transient ischemic attack)  G45.9 Adult  Neurology  Referral     Adult Cardiac Event Monitor        PLAN: At today's visit we thoroughly discussed current symptoms, results of stroke evaluation, symptoms and signs of stroke, secondary stroke prevention measures, and the plan.    Symptoms and signs of stroke were discussed.  Advised the patient to call 911 with any SUDDEN onset of weakness, vision loss, speech problems, numbness, or severe headache of unknown cause.    For stroke prevention, I counseled the patient to:  -Continue 81 mg of aspirin daily unless his heart monitor demonstrates atrial fibrillation.  In such case he would need stronger medication that we will discuss after heart monitoring  -Long-term blood pressure goal is less than 130/85  -Long-term LDL goal is 40-70  -Long-term goal of hemoglobin A1C is less than 7.0  -Low-salt low-fat diet  -Regular aerobic exercise 30 minutes 3-4 times per week    Next follow-up appointment is on as needed basis (based on heart monitoring results).    Total Time: 53 minutes spent on the date of the encounter doing chart review, history and exam, documentation and further activities per the note.    Omar Fatima MD  [facility] Neurology  (Chart documentation was completed in part with Dragon voice-recognition software. Even though reviewed, some grammatical, spelling, and word errors may remain.)

## 2023-12-15 NOTE — LETTER
12/15/2023         RE: Luis F Gipson  [id number] [place] [address]        Dear Colleague,    Thank you for referring your patient, Luis F Gipson, to the [facility]. Please see a copy of my visit note below.    INITIAL NEUROLOGY CONSULTATION    DATE OF VISIT: 12/15/2023  CLINIC LOCATION: [facility]  MRN: [id number]  PATIENT NAME: Luis F Gipson  YOB: 1960    REASON FOR VISIT:   Chief Complaint   Patient presents with     Consult     Follow Up on TIA      HISTORY OF PRESENT ILLNESS:                                                    Mr. Luis F Gipson is 63 year old right handed male patient with past medical history of ascending aortic aneurysm, anxiety, and headaches, who was seen today for TIA.    Per patient's report, approximately a year ago on 12/11/2022 he experienced transient episode of left-sided numbness that affected his face, torso, and upper and lower limbs.  It lasted slightly less than 10 minutes and spontaneously resolved.  He was evaluated at [facility] and stayed overnight.  See evaluation below.  Was told that he had TIA.  He is on topiramate and sumatriptan for migraine management, but since this event has not had any migraines.  On 81 mg of daily aspirin along with blood pressure medications.  He brought me his blood pressure diary, which we reviewed.  His blood pressure readings are under 140/90.    Laboratory evaluation from May 2023 includes unremarkable BMP.  Latest LDL in our system is from July 2021 (92).  Hemoglobin A1C was 5.1 in December 2022 (according to [facility]).  LDL at that time was 46.    CTA from 6/20/2023 demonstrated moderate narrowing in the proximal left P2 segment and moderate narrowing in the distal right P2 segment along with mild narrowing in the right P2 segment elsewhere.  In addition, tiny 1 mm outpouching at the origin of the  right posterior communicating artery, unchanged from previous study and favored to represent vascular infundibulum, was noted.    Brain MRI without contrast from 12/12/2022 demonstrated mild cerebral volume loss and chronic microvascular ischemic changes, but no acute findings.   Head and neck CTA demonstrated right P2 segment severe stenosis and right supraclinoid ICA small 2 mm inferior outpouching with differential including infundibulum versus small aneurysm.  Neck CTA was unrevealing for stenosis, occlusion, or dissection.  I personally reviewed and independently interpreted all images.    Echocardiogram at that time was negative for PFO or intracardiac thrombus.  I do not see prolonged heart monitoring.  PAST MEDICAL/SURGICAL HISTORY:                                                    I personally reviewed patient's past medical and surgical history with the patient at today's visit.  MEDICATIONS:                                                    I personally reviewed patient's medications and allergies with the patient at today's visit.  ALLERGIES:                                                    No Known Allergies  EXAM:                                                    VITAL SIGNS:   /78 (BP Location: Right arm, Patient Position: Sitting, Cuff Size: Adult Regular)   Pulse 86   SpO2 99%   Mini-Cog Assessment:  Mini Cog Assessment  Clock Draw Score: 2 Normal  3 Item Recall: 3 objects recalled  Mini Cog Total Score: 5  Administered by: : Mari ROGERS    General: pt is in NAD, cooperative.  Skin: normal turgor, moist mucous membranes, no lesions/rashes noticed.  HEENT: ATNC, EOMI, PERRL, white sclera, normal conjunctiva, no nystagmus or ptosis. No carotid bruits bilaterally.  Respiratory: lung sounds clear to auscultation bilaterally, no crackles, wheezes, rhonchi. Symmetric lung excursion, no accessory respiratory muscle use.  Cardiovascular: normal S1/S2, no murmurs/rubs/gallops.   Abdomen: Not  distended.  : deferred.    Neurological:  Mental: alert, follows commands, Mini Cog Total Score: 5/5 with 3/3 on memory recall, no aphasia or dysarthria. Fund of knowledge is appropriate for age.  Cranial Nerves:  CN II: visual acuity - able to accurately count fingers with each eye. Visual fields intact, fundi: discs sharp, no papilledema and normal vessels bilaterally.  CN III, IV, VI: EOM intact, pupils equal and reactive  CN V: facial sensation nl  CN VII: face symmetric, no facial droop  CN VIII: hearing normal  CN IX: palate elevation symmetric, uvula at midline  CN XI SCM normal, shoulder shrug nl  CN XII: tongue midline  Motor: Strength: 5/5 in all major groups of all extremities. Normal tone. No abnormal movements. No pronator drift b/l.  Reflexes: Triceps, biceps, brachioradialis, patellar, and achilles reflexes normal and symmetric. No clonus noted. Toes are down-going b/l.   Sensory: light touch, pinprick, and vibration intact. Romberg: negative.  Coordination: FNF and heel-shin tests intact b/l.   Gait:  Normal casual walk.  DATA:   LABS/EEG/IMAGING/OTHER STUDIES: I reviewed pertinent medical records, as detailed in the history of present illness.  ASSESSMENT AND PLAN:      ASSESSMENT: Luis F Gipson is a 63 year old male patient with listed above past medical history, who presents after an episode of left-sided transient sensory symptoms in December 2022, consistent with TIA, without recurrence.    We had a detailed discussion with the patient regarding his presenting complaints.  The neurological exam today is non-focal.  We reviewed his TIA workup as well as repeat head CTA that demonstrated 1 mm outpouching at the origin of the right posterior communicating artery, likely representing infundibulum.  I do not believe that additional future imaging is needed for this finding.    Regarding his TIA, most of the workup was done, except 14-day cardiac monitoring, which I ordered to evaluate for  "atrial fibrillation.  Meanwhile, the patient was advised to continue aspirin, but we discussed that if atrial fibrillation is found he will need anticoagulation under the guidance of his primary care provider.    DIAGNOSES:    ICD-10-CM    1. TIA (transient ischemic attack)  G45.9 Adult Neurology  Referral     Adult Cardiac Event Monitor        PLAN: At today's visit we thoroughly discussed current symptoms, results of stroke evaluation, symptoms and signs of stroke, secondary stroke prevention measures, and the plan.    Symptoms and signs of stroke were discussed.  Advised the patient to call 911 with any SUDDEN onset of weakness, vision loss, speech problems, numbness, or severe headache of unknown cause.    For stroke prevention, I counseled the patient to:  -Continue 81 mg of aspirin daily unless his heart monitor demonstrates atrial fibrillation.  In such case he would need stronger medication that we will discuss after heart monitoring  -Long-term blood pressure goal is less than 130/85  -Long-term LDL goal is 40-70  -Long-term goal of hemoglobin A1C is less than 7.0  -Low-salt low-fat diet  -Regular aerobic exercise 30 minutes 3-4 times per week    Next follow-up appointment is on as needed basis (based on heart monitoring results).    Total Time: 53 minutes spent on the date of the encounter doing chart review, history and exam, documentation and further activities per the note.    Omar Fatima MD  [facility] Neurology  (Chart documentation was completed in part with Dragon voice-recognition software. Even though reviewed, some grammatical, spelling, and word errors may remain.)            Luis F Gipson is a 63 year old male who presents for:  Chief Complaint   Patient presents with     Consult     Follow Up on TIA         Initial Vitals:  /78 (BP Location: Right arm, Patient Position: Sitting, Cuff Size: Adult Regular)   Pulse 86   Ht 1.778 m (5' 10\")   Wt 91.5 kg " "(201 lb 12.8 oz)   SpO2 99%   BMI 28.96 kg/m   Estimated body mass index is 28.96 kg/m  as calculated from the following:    Height as of this encounter: 1.778 m (5' 10\").    Weight as of this encounter: 91.5 kg (201 lb 12.8 oz).. Body surface area is 2.13 meters squared. BP completed using cuff size: charli Jorgensen      Again, thank you for allowing me to participate in the care of your patient.        Sincerely,        Omar Fatima MD  "

## 2023-12-27 DIAGNOSIS — Z13.6 CARDIOVASCULAR SCREENING; LDL GOAL LESS THAN 160: ICD-10-CM

## 2023-12-27 RX ORDER — SALICYLIC ACID 40 %
81 ADHESIVE PATCH, MEDICATED TOPICAL DAILY
Qty: 90 TABLET | Refills: 1 | Status: SHIPPED | OUTPATIENT
Start: 2023-12-27 | End: 2024-07-01

## 2023-12-27 NOTE — TELEPHONE ENCOUNTER
Prescription approved per [facility] Refill Protocol.  Azucena Hawk, RN  [facility] Triage Nurse

## 2024-01-09 ENCOUNTER — HOSPITAL ENCOUNTER (OUTPATIENT)
Dept: CT IMAGING | Facility: CLINIC | Age: 64
Discharge: HOME | End: 2024-01-09
Attending: INTERNAL MEDICINE | Admitting: INTERNAL MEDICINE
Payer: COMMERCIAL

## 2024-01-09 DIAGNOSIS — Q25.40 ABNORMALITY OF THORACIC AORTA: ICD-10-CM

## 2024-01-09 DIAGNOSIS — I71.21 ANEURYSM OF ASCENDING AORTA WITHOUT RUPTURE (H): ICD-10-CM

## 2024-01-09 DIAGNOSIS — R91.8 PULMONARY NODULES: ICD-10-CM

## 2024-01-09 PROCEDURE — 250N000011 HC RX IP 250 OP 636: Performed by: INTERNAL MEDICINE

## 2024-01-09 PROCEDURE — 250N000009 HC RX 250: Performed by: INTERNAL MEDICINE

## 2024-01-09 PROCEDURE — 71275 CT ANGIOGRAPHY CHEST: CPT

## 2024-01-09 RX ORDER — IOPAMIDOL 755 MG/ML
72 INJECTION, SOLUTION INTRAVASCULAR ONCE
Status: COMPLETED | OUTPATIENT
Start: 2024-01-09 | End: 2024-01-09

## 2024-01-09 RX ADMIN — IOPAMIDOL 72 ML: 755 INJECTION, SOLUTION INTRAVENOUS at 11:38

## 2024-01-09 RX ADMIN — SODIUM CHLORIDE 80 ML: 9 INJECTION, SOLUTION INTRAVENOUS at 11:38

## 2024-01-18 ENCOUNTER — PATIENT OUTREACH (OUTPATIENT)
Dept: CARE COORDINATION | Facility: CLINIC | Age: 64
End: 2024-01-18
Payer: COMMERCIAL

## 2024-01-29 DIAGNOSIS — F41.9 ANXIETY: ICD-10-CM

## 2024-01-29 RX ORDER — PAROXETINE 10 MG/1
10 TABLET, FILM COATED ORAL DAILY
Qty: 90 TABLET | Refills: 0 | Status: SHIPPED | OUTPATIENT
Start: 2024-01-29 | End: 2024-04-10

## 2024-01-29 NOTE — TELEPHONE ENCOUNTER
Per chart, patient reports taking paroxetine 10 mg once a day.  Medication refilled for 90 days.  Has appointment in April

## 2024-02-15 ENCOUNTER — HOSPITAL ENCOUNTER (OUTPATIENT)
Dept: CARDIOLOGY | Facility: CLINIC | Age: 64
Discharge: HOME | End: 2024-02-15
Attending: PSYCHIATRY & NEUROLOGY | Admitting: PSYCHIATRY & NEUROLOGY
Payer: COMMERCIAL

## 2024-02-15 DIAGNOSIS — G45.9 TIA (TRANSIENT ISCHEMIC ATTACK): ICD-10-CM

## 2024-02-15 PROCEDURE — 93270 REMOTE 30 DAY ECG REV/REPORT: CPT

## 2024-02-15 PROCEDURE — 93272 ECG/REVIEW INTERPRET ONLY: CPT | Performed by: INTERNAL MEDICINE

## 2024-02-28 DIAGNOSIS — I10 HYPERTENSION, UNSPECIFIED TYPE: ICD-10-CM

## 2024-02-28 RX ORDER — HYDROCHLOROTHIAZIDE 25 MG/1
25 TABLET ORAL DAILY
Qty: 90 TABLET | Refills: 0 | Status: SHIPPED | OUTPATIENT
Start: 2024-02-28 | End: 2024-04-10

## 2024-02-28 NOTE — TELEPHONE ENCOUNTER
Prescription approved per [facility] Refill Protocol.  Azucena Hawk, RN  [facility] Triage Nurse

## 2024-03-16 ENCOUNTER — HEALTH MAINTENANCE LETTER (OUTPATIENT)
Age: 64
End: 2024-03-16

## 2024-04-10 ENCOUNTER — OFFICE VISIT (OUTPATIENT)
Dept: INTERNAL MEDICINE | Facility: CLINIC | Age: 64
End: 2024-04-10
Payer: COMMERCIAL

## 2024-04-10 VITALS
WEIGHT: 202.3 LBS | DIASTOLIC BLOOD PRESSURE: 70 MMHG | OXYGEN SATURATION: 98 % | TEMPERATURE: 97.4 F | SYSTOLIC BLOOD PRESSURE: 130 MMHG | HEIGHT: 71 IN | RESPIRATION RATE: 16 BRPM | HEART RATE: 66 BPM | BODY MASS INDEX: 28.32 KG/M2

## 2024-04-10 DIAGNOSIS — Z12.5 SCREENING FOR PROSTATE CANCER: ICD-10-CM

## 2024-04-10 DIAGNOSIS — Z00.00 MEDICARE ANNUAL WELLNESS VISIT, INITIAL: ICD-10-CM

## 2024-04-10 DIAGNOSIS — F41.9 ANXIETY: ICD-10-CM

## 2024-04-10 DIAGNOSIS — I10 HYPERTENSION, UNSPECIFIED TYPE: ICD-10-CM

## 2024-04-10 DIAGNOSIS — K21.9 GASTROESOPHAGEAL REFLUX DISEASE WITHOUT ESOPHAGITIS: ICD-10-CM

## 2024-04-10 DIAGNOSIS — Z13.6 CARDIOVASCULAR SCREENING; LDL GOAL LESS THAN 100: ICD-10-CM

## 2024-04-10 DIAGNOSIS — Z13.1 SCREENING FOR DIABETES MELLITUS: ICD-10-CM

## 2024-04-10 DIAGNOSIS — I71.21 ANEURYSM OF ASCENDING AORTA WITHOUT RUPTURE (H): ICD-10-CM

## 2024-04-10 PROCEDURE — 99214 OFFICE O/P EST MOD 30 MIN: CPT | Mod: 25 | Performed by: INTERNAL MEDICINE

## 2024-04-10 PROCEDURE — G0438 PPPS, INITIAL VISIT: HCPCS | Performed by: INTERNAL MEDICINE

## 2024-04-10 RX ORDER — PAROXETINE 10 MG/1
10 TABLET, FILM COATED ORAL DAILY
Qty: 90 TABLET | Refills: 3 | Status: SHIPPED | OUTPATIENT
Start: 2024-04-10

## 2024-04-10 RX ORDER — PANTOPRAZOLE SODIUM 40 MG/1
40 TABLET, DELAYED RELEASE ORAL DAILY
Qty: 90 TABLET | Refills: 3 | Status: SHIPPED | OUTPATIENT
Start: 2024-04-10

## 2024-04-10 RX ORDER — HYDROCHLOROTHIAZIDE 25 MG/1
25 TABLET ORAL DAILY
Qty: 90 TABLET | Refills: 3 | Status: SHIPPED | OUTPATIENT
Start: 2024-04-10

## 2024-04-10 RX ORDER — METOPROLOL SUCCINATE 50 MG/1
TABLET, EXTENDED RELEASE ORAL
Qty: 45 TABLET | Refills: 3 | Status: SHIPPED | OUTPATIENT
Start: 2024-04-10

## 2024-04-10 RX ORDER — AMLODIPINE AND VALSARTAN 5; 160 MG/1; MG/1
1 TABLET ORAL DAILY
Qty: 90 TABLET | Refills: 3 | Status: SHIPPED | OUTPATIENT
Start: 2024-04-10

## 2024-04-10 SDOH — HEALTH STABILITY: PHYSICAL HEALTH: ON AVERAGE, HOW MANY DAYS PER WEEK DO YOU ENGAGE IN MODERATE TO STRENUOUS EXERCISE (LIKE A BRISK WALK)?: 5 DAYS

## 2024-04-10 SDOH — HEALTH STABILITY: PHYSICAL HEALTH: ON AVERAGE, HOW MANY MINUTES DO YOU ENGAGE IN EXERCISE AT THIS LEVEL?: 40 MIN

## 2024-04-10 SDOH — HEALTH STABILITY: PHYSICAL HEALTH: ON AVERAGE, HOW MANY DAYS PER WEEK DO YOU ENGAGE IN MODERATE TO STRENUOUS EXERCISE (LIKE A BRISK WALK)?: 4 DAYS

## 2024-04-10 ASSESSMENT — SOCIAL DETERMINANTS OF HEALTH (SDOH)
HOW OFTEN DO YOU GET TOGETHER WITH FRIENDS OR RELATIVES?: TWICE A WEEK
HOW OFTEN DO YOU GET TOGETHER WITH FRIENDS OR RELATIVES?: THREE TIMES A WEEK

## 2024-04-10 ASSESSMENT — ANXIETY QUESTIONNAIRES
IF YOU CHECKED OFF ANY PROBLEMS ON THIS QUESTIONNAIRE, HOW DIFFICULT HAVE THESE PROBLEMS MADE IT FOR YOU TO DO YOUR WORK, TAKE CARE OF THINGS AT HOME, OR GET ALONG WITH OTHER PEOPLE: NOT DIFFICULT AT ALL
3. WORRYING TOO MUCH ABOUT DIFFERENT THINGS: NOT AT ALL
7. FEELING AFRAID AS IF SOMETHING AWFUL MIGHT HAPPEN: NOT AT ALL
6. BECOMING EASILY ANNOYED OR IRRITABLE: NOT AT ALL
GAD7 TOTAL SCORE: 0
GAD7 TOTAL SCORE: 0
2. NOT BEING ABLE TO STOP OR CONTROL WORRYING: NOT AT ALL
1. FEELING NERVOUS, ANXIOUS, OR ON EDGE: NOT AT ALL
5. BEING SO RESTLESS THAT IT IS HARD TO SIT STILL: NOT AT ALL

## 2024-04-10 ASSESSMENT — PATIENT HEALTH QUESTIONNAIRE - PHQ9: 5. POOR APPETITE OR OVEREATING: NOT AT ALL

## 2024-04-10 NOTE — PATIENT INSTRUCTIONS
Continue current medications  Prescriptions refilled.    Call  [phone] or use [employer] to schedule a future lab appointment  fasting in 1-2 year.   For fasting labs, please refrain from eating for 8 hours or more.   Drink 2 glasses of water before your lab appointment. It is fine to take your  oral medications on the morning of the lab test as usual  Schedule an eye exam appointment. Please ask the eye clinic to fax us a report of your eye exam to [phone], attention Dr Boykin   [facility]  Directive discussed and given copy.   Patient to complete and return to clinic  Will  place orders for CT chest again in 1 year when  seen to follow-up on thoracic aortic aneurysm  Pt was informed regarding extra E&M billing for management of new or established medical issues not related to today's wellness/screening visit

## 2024-04-10 NOTE — PROGRESS NOTES
Preventive Care Visit  [facility]  Ronald Boykin MD, Internal Medicine  Apr 10, 2024      ASSESSMENT:    1. Medicare annual wellness visit, initial  Patient declines COVID or influenza vaccinations.  Colonoscopy due October 2025.  Screening labs as below.  Due for eye exam    2. Hypertension, unspecified type  Controlled.  Continue current medication.  Future labs ordered  - Comprehensive metabolic panel; Future  - amLODIPine-valsartan (EXFORGE) 5-160 MG tablet; Take 1 tablet by mouth daily  Dispense: 90 tablet; Refill: 3  - hydrochlorothiazide (HYDRODIURIL) 25 MG tablet; Take 1 tablet (25 mg) by mouth daily  Dispense: 90 tablet; Refill: 3  - metoprolol succinate ER (TOPROL XL) 50 MG 24 hr tablet; Take 1/2 tab daily by mouth  Dispense: 45 tablet; Refill: 3    3. Anxiety  Controlled. GAD7 = 0.  Continue current medication    - metoprolol succinate ER (TOPROL XL) 50 MG 24 hr tablet; Take 1/2 tab daily by mouth  Dispense: 45 tablet; Refill: 3  - PARoxetine (PAXIL) 10 MG tablet; Take 1 tablet (10 mg) by mouth daily Take 1 tab daily  Dispense: 90 tablet; Refill: 3    4. Aneurysm of ascending aorta without rupture (H24)  Stable with most recent imaging.  Recheck CT chest in 1 year    5. Gastroesophageal reflux disease without esophagitis  Controlled.  Has recurrence of symptoms off of PPI therapy.  Continue pantoprazole  - pantoprazole (PROTONIX) 40 MG EC tablet; Take 1 tablet (40 mg) by mouth daily  Dispense: 90 tablet; Refill: 3    6. Screening for prostate cancer  Due for screening.  Previous PSA normal  - Prostate Specific Antigen Screen; Future    7. Screening for diabetes mellitus  - Comprehensive metabolic panel; Future    8. CARDIOVASCULAR SCREENING; LDL GOAL LESS THAN 100  - Lipid panel reflex to direct LDL Fasting; Future      PLAN:  Continue current medications  Prescriptions refilled.    Call  [phone] or use [employer] to schedule a future lab appointment  fasting in 1-2 year.   For  fasting labs, please refrain from eating for 8 hours or more.   Drink 2 glasses of water before your lab appointment. It is fine to take your  oral medications on the morning of the lab test as usual  Schedule an eye exam appointment. Please ask the eye clinic to fax us a report of your eye exam to [phone], attention Dr Boykin   Healthcare  Directive discussed and given copy.   Patient to complete and return to clinic  Will  place orders for CT chest again in 1 year when  seen to follow-up on thoracic aortic aneurysm  Pt was informed regarding extra E&M billing for management of new or established medical issues not related to today's wellness/screening visit           Hernan Kerns is a 64 year old, presenting for the following:  Medicare Visit (Not fasting)  And follow-up regarding medical issues as above       Health Care Directive  Patient does not have a Health Care Directive or Living Will: Discussed advance care planning with patient; information given to patient to review.    HPI         4/10/2024   General Health   How would you rate your overall physical health? Good   Feel stress (tense, anxious, or unable to sleep) Only a little   (!) STRESS CONCERN      4/10/2024   Nutrition   Diet: Regular (no restrictions)         4/10/2024   Exercise   Days per week of moderate/strenous exercise 5 days   Average minutes spent exercising at this level 40 min         4/10/2024   Social Factors   Frequency of gathering with friends or relatives Three times a week   Worry food won't last until get money to buy more No   Food not last or not have enough money for food? No   Do you have housing?  Yes   Are you worried about losing your housing? No   Lack of transportation? No   Unable to get utilities (heat,electricity)? No         4/10/2024   Fall Risk   Fallen 2 or more times in the past year? No    No   Trouble with walking or balance? No    No          4/10/2024   Activities of Daily Living- Home Safety   Needs  help with the following daily activites None of the above   Safety concerns in the home None of the above         4/10/2024   Dental   Dentist two times every year? (!) NO         4/10/2024   Hearing Screening   Hearing concerns? None of the above         4/10/2024   Driving Risk Screening   Patient/family members have concerns about driving No         4/10/2024   General Alertness/Fatigue Screening   Have you been more tired than usual lately? No         4/10/2024   Urinary Incontinence Screening   Bothered by leaking urine in past 6 months No            Today's PHQ-2 Score:       4/10/2024     1:00 PM   PHQ-2 ( 1999 [employer])   Q1: Little interest or pleasure in doing things 0   Q2: Feeling down, depressed or hopeless 0   PHQ-2 Score 0   Q1: Little interest or pleasure in doing things Not at all   Q2: Feeling down, depressed or hopeless Not at all   PHQ-2 Score 0           4/10/2024   Substance Use   Alcohol more than 3/day or more than 7/wk Yes   How often do you have a drink containing alcohol 2 to 3 times a week   How many alcohol drinks on typical day 1 or 2   How often do you have 5+ drinks at one occasion Weekly   Audit 2/3 Score 3   How often not able to stop drinking once started Never   How often failed to do what normally expected Never   How often needed first drink in am after a heavy drinking session Never   How often feeling of guilt or remorse after drinking Never   How often unable to remember what happened the night before Never   Have you or someone else been injured because of your drinking No   Has anyone been concerned or suggested you cut down on drinking No   TOTAL SCORE - AUDIT 6   Do you have a current opioid prescription? No   How severe/bad is pain from 1 to 10? 1/10   Do you use any other substances recreationally? No     Social History     Tobacco Use    Smoking status: Former     Current packs/day: 0.00     Types: Cigarettes     Quit date: 3/27/2009     Years since quitting: 15.0     Smokeless tobacco: Never    Tobacco comments:     2-3 daily   Vaping Use    Vaping status: Never Used   Substance Use Topics    Alcohol use: Yes     Comment: couple drinks 4 days/wekk    Drug use: No        Last PSA:   PSA   Date Value Ref Range Status   01/07/2020 1.07 0 - 4 ug/L Final     Comment:     Assay Method:  Chemiluminescence using Siemens Vista analyzer     Prostate Specific Antigen Screen   Date Value Ref Range Status   01/19/2023 0.79 0.00 - 4.50 ng/mL Final   07/13/2021 1.12 ug/L Final     ASCVD Risk   The ASCVD Risk score (Roscoe BOWMAN, et al., 2019) failed to calculate for the following reasons:    The patient has a prior MI or stroke diagnosis           Current providers sharing in care for this patient include:  Patient Care Team:  Ronald Boykin MD as PCP - General  Ronald Boykin MD as Assigned PCP  Omar Fatima MD as MD (Neurology)  Omar Fatima MD as Assigned Neuroscience Provider    The following health maintenance items are reviewed in Epic and correct as of today:  Health Maintenance   Topic Date Due    RSV VACCINE (Pregnancy & 60+) (1 - 1-dose 60+ series) Never done    Pneumococcal Vaccine: 65+ Years (1 of 1 - PCV) Never done    INFLUENZA VACCINE (1) Never done    COVID-19 Vaccine (4 - 2023-24 season) 09/01/2023    MEDICARE ANNUAL WELLNESS VISIT  01/19/2024    ANNUAL REVIEW OF HM ORDERS  08/10/2024    ADVANCE CARE PLANNING  01/08/2025    FALL RISK ASSESSMENT  04/10/2025    COLORECTAL CANCER SCREENING  10/14/2025    GLUCOSE  05/09/2026    LIPID  07/13/2026    DTAP/TDAP/TD IMMUNIZATION (2 - Td or Tdap) 08/17/2027    HEPATITIS C SCREENING  Completed    PHQ-2 (once per calendar year)  Completed    ZOSTER IMMUNIZATION  Completed    AORTIC ANEURYSM SCREENING (SYSTEM ASSIGNED)  Completed    IPV IMMUNIZATION  Aged Out    HPV IMMUNIZATION  Aged Out    MENINGITIS IMMUNIZATION  Aged Out    RSV MONOCLONAL ANTIBODY  Aged Out    LUNG CANCER SCREENING   "Discontinued         Review of Systems  CONSTITUTIONAL: NEGATIVE for fever, chills. Weight up 1 pound in 1 year  INTEGUMENTARY/SKIN: NEGATIVE for worrisome rashes, moles or lesions  EYES: NEGATIVE for vision changes or irritation  ENT/MOUTH: NEGATIVE for ear, mouth and throat problems  RESP: NEGATIVE for significant cough or SOB  BREAST: NEGATIVE for masses, tenderness or discharge  CV: NEGATIVE for chest pain, palpitations or peripheral edema  GI: NEGATIVE for nausea, abdominal pain, heartburn, or change in bowel habits  : NEGATIVE for frequency, dysuria, or hematuria  MUSCULOSKELETAL: NEGATIVE for significant arthralgias or myalgia except for chronic left shoulder pain with DJD. CDeclines treatment  NEURO: NEGATIVE for weakness, dizziness or paresthesias. No recent migraines  ENDOCRINE: NEGATIVE for temperature intolerance, skin/hair changes  HEME: NEGATIVE for bleeding problems.  PSYCHIATRIC: NEGATIVE for changes in mood or affect with meds. GAD7= 0.      Objective    Exam  /70 (BP Location: Left arm, Patient Position: Sitting, Cuff Size: Adult Large)   Pulse 66   Temp 97.4  F (36.3  C) (Temporal)   Resp 16   Ht 1.803 m (5' 11\")   Wt 91.8 kg (202 lb 4.8 oz)   SpO2 98%   BMI 28.22 kg/m     Estimated body mass index is 28.22 kg/m  as calculated from the following:    Height as of this encounter: 1.803 m (5' 11\").    Weight as of this encounter: 91.8 kg (202 lb 4.8 oz).    Physical Exam  General appearance - healthy, alert, no distress  Skin - No rashes or lesions. Few seborrheic keratosis on  trunk.   Head - normocephalic, atraumatic  Eyes - VIVEK, EOMI, fundi exam with nondilated pupils negative.  Ears - External ears normal. Canals clear. TM's normal.  Nose/Sinuses - Nares normal. Septum midline. Mucosa normal. No drainage or sinus tenderness.  Oropharynx - No erythema, no adenopathy, no exudates.  Neck - Supple without adenopathy or thyromegaly. No bruits.  Lungs - Clear to auscultation without " wheezes/rhonchi.  Heart - Regular rate and rhythm without murmurs, clicks, or gallops.  Nodes - No supraclavicular, axillary, or inguinal adenopathy palpable.  Abdomen - Abdomen soft, non-tender. BS normal. No masses or hepatosplenomegaly palpable. No bruits.  Extremities -No cyanosis, clubbing or edema.    Musculoskeletal - Spine ROM normal. Muscular strength intact.  Tenderness abduction  left shoulder  Peripheral pulses - radial=4/4, femoral=4/4, posterior tibial=4/4, dorsalis pedis=4/4,  Neuro - Gait normal. Reflexes normal and symmetric. Sensation grossly WNL.  Genital - Normal-appearing male external genitalia. No scrotal masses or inguinal hernia palpable.   Rectal - deferred           4/10/2024   Mini Cog   Clock Draw Score 2 Normal   3 Item Recall 2 objects recalled   Mini Cog Total Score 4          Vision Screen  Vision Screen Results: Pass  No Corrective Lenses, PLUS LENS REQUIRED: Pass       Signed Electronically by: Ronald Boykin MD

## 2024-05-03 ENCOUNTER — LAB (OUTPATIENT)
Dept: LAB | Facility: CLINIC | Age: 64
End: 2024-05-03
Payer: COMMERCIAL

## 2024-05-03 DIAGNOSIS — I10 HYPERTENSION, UNSPECIFIED TYPE: ICD-10-CM

## 2024-05-03 DIAGNOSIS — Z13.6 CARDIOVASCULAR SCREENING; LDL GOAL LESS THAN 100: ICD-10-CM

## 2024-05-03 DIAGNOSIS — Z12.5 SCREENING FOR PROSTATE CANCER: ICD-10-CM

## 2024-05-03 DIAGNOSIS — Z13.1 SCREENING FOR DIABETES MELLITUS: ICD-10-CM

## 2024-05-03 PROCEDURE — G0103 PSA SCREENING: HCPCS

## 2024-05-03 PROCEDURE — 80053 COMPREHEN METABOLIC PANEL: CPT

## 2024-05-03 PROCEDURE — 36415 COLL VENOUS BLD VENIPUNCTURE: CPT

## 2024-05-03 PROCEDURE — 80061 LIPID PANEL: CPT

## 2024-05-04 LAB
ALBUMIN SERPL BCG-MCNC: 4.8 G/DL (ref 3.5–5.2)
ALP SERPL-CCNC: 55 U/L (ref 40–150)
ALT SERPL W P-5'-P-CCNC: 24 U/L (ref 0–70)
ANION GAP SERPL CALCULATED.3IONS-SCNC: 10 MMOL/L (ref 7–15)
AST SERPL W P-5'-P-CCNC: 23 U/L (ref 0–45)
BILIRUB SERPL-MCNC: 1.2 MG/DL
BUN SERPL-MCNC: 14.4 MG/DL (ref 8–23)
CALCIUM SERPL-MCNC: 9.5 MG/DL (ref 8.8–10.2)
CHLORIDE SERPL-SCNC: 95 MMOL/L (ref 98–107)
CHOLEST SERPL-MCNC: 166 MG/DL
CREAT SERPL-MCNC: 1.02 MG/DL (ref 0.67–1.17)
DEPRECATED HCO3 PLAS-SCNC: 27 MMOL/L (ref 22–29)
EGFRCR SERPLBLD CKD-EPI 2021: 81 ML/MIN/1.73M2
FASTING STATUS PATIENT QL REPORTED: YES
GLUCOSE SERPL-MCNC: 99 MG/DL (ref 70–99)
HDLC SERPL-MCNC: 50 MG/DL
LDLC SERPL CALC-MCNC: 80 MG/DL
NONHDLC SERPL-MCNC: 116 MG/DL
POTASSIUM SERPL-SCNC: 4.6 MMOL/L (ref 3.4–5.3)
PROT SERPL-MCNC: 6.8 G/DL (ref 6.4–8.3)
PSA SERPL DL<=0.01 NG/ML-MCNC: 0.96 NG/ML (ref 0–4.5)
SODIUM SERPL-SCNC: 132 MMOL/L (ref 135–145)
TRIGL SERPL-MCNC: 180 MG/DL

## 2024-05-10 ENCOUNTER — TELEPHONE (OUTPATIENT)
Dept: INTERNAL MEDICINE | Facility: CLINIC | Age: 64
End: 2024-05-10

## 2024-05-10 NOTE — TELEPHONE ENCOUNTER
Pt calling looking for his result note from labs 5/3 that were follow up to Physical 4/10/24.    Dr. Boykin,  Please review results and send patient a mychart result note .    Writer noted sodium was down to 132    Sodium   Date Value Ref Range Status   05/03/2024 132 (L) 135 - 145 mmol/L Final     Comment:     Reference intervals for this test were updated on 09/26/2023 to more accurately reflect our healthy population. There may be differences in the flagging of prior results with similar values performed with this method. Interpretation of those prior results can be made in the context of the updated reference intervals.    01/07/2020 135 133 - 144 mmol/L Final

## 2024-05-17 NOTE — TELEPHONE ENCOUNTER
Pt is requesting PCP's advice on 05/03/2024 lab results. Pt wants provider advice on results. Please add lab recommendations on MC or call pt. Pt needs a response today.

## 2024-05-30 ENCOUNTER — MYC MEDICAL ADVICE (OUTPATIENT)
Dept: INTERNAL MEDICINE | Facility: CLINIC | Age: 64
End: 2024-05-30

## 2024-07-01 DIAGNOSIS — Z13.6 CARDIOVASCULAR SCREENING; LDL GOAL LESS THAN 160: ICD-10-CM

## 2024-07-01 RX ORDER — ASPIRIN 81 MG/1
81 TABLET, COATED ORAL DAILY
Qty: 90 TABLET | Refills: 1 | Status: SHIPPED | OUTPATIENT
Start: 2024-07-01

## 2024-07-01 NOTE — TELEPHONE ENCOUNTER
Prescription approved per [facility] Refill Protocol.  Rosalind Zapata RN  [facility]

## 2024-09-05 ENCOUNTER — MYC MEDICAL ADVICE (OUTPATIENT)
Dept: INTERNAL MEDICINE | Facility: CLINIC | Age: 64
End: 2024-09-05
Payer: COMMERCIAL

## 2024-09-05 DIAGNOSIS — G89.29 CHRONIC LEFT SHOULDER PAIN: Primary | ICD-10-CM

## 2024-09-05 DIAGNOSIS — E87.1 HYPONATREMIA: ICD-10-CM

## 2024-09-05 DIAGNOSIS — M25.512 CHRONIC LEFT SHOULDER PAIN: Primary | ICD-10-CM

## 2024-09-10 NOTE — TELEPHONE ENCOUNTER
Pt following up on lab results request from 5/3/24.     Also requesting a cortisone injection. Orthro referral pending.     Routing to provider; please review and sign if approved. Please add note to pt chart regarding labs.

## 2024-09-11 NOTE — TELEPHONE ENCOUNTER
Spoke with patient.  Referral to [facility] placed regarding chronic left shoulder pain.  Discussed lab results.  Minimal hyponatremia and mild triglyceride elevation.   Other labs okay.  On hydrochlorothiazide for blood pressure control.  Will continue current medications.  Reduce carbohydrate intake including reduction of alcohol use.  Will repeat nonfasting BMP in early November

## 2024-09-16 ENCOUNTER — PATIENT OUTREACH (OUTPATIENT)
Dept: CARE COORDINATION | Facility: CLINIC | Age: 64
End: 2024-09-16
Payer: COMMERCIAL

## 2024-11-06 ENCOUNTER — OFFICE VISIT (OUTPATIENT)
Dept: ORTHOPEDICS | Facility: CLINIC | Age: 64
End: 2024-11-06
Attending: INTERNAL MEDICINE
Payer: COMMERCIAL

## 2024-11-06 DIAGNOSIS — M25.512 CHRONIC LEFT SHOULDER PAIN: ICD-10-CM

## 2024-11-06 DIAGNOSIS — G89.29 CHRONIC LEFT SHOULDER PAIN: ICD-10-CM

## 2024-11-06 DIAGNOSIS — M19.012 OSTEOARTHRITIS OF GLENOHUMERAL JOINT, LEFT: Primary | ICD-10-CM

## 2024-11-06 RX ADMIN — METHYLPREDNISOLONE ACETATE 40 MG: 40 INJECTION, SUSPENSION INTRA-ARTICULAR; INTRALESIONAL; INTRAMUSCULAR; SOFT TISSUE at 14:09

## 2024-11-06 RX ADMIN — LIDOCAINE HYDROCHLORIDE 4 ML: 10 INJECTION, SOLUTION INFILTRATION; PERINEURAL at 14:09

## 2024-11-06 NOTE — PROGRESS NOTES
CHIEF COMPLAINT:  Chronic left shoulder pain    HISTORY OF PRESENT ILLNESS  Mr. Gipson is a pleasant 64 year old year old male who presents to clinic today with left shoulder pain.  Luis F explains that he was seen for his shoulder pain 1 year ago and his pain has progressed since then.  He notes a remote history of left shoulder rotator cuff tear that was treated conservatively.    Onset: gradual  Location: left shoulder  Quality:  stabbing and sharp  Duration: 2-3 years   Severity: 6/10 at worst  Timing: intermittent episodes with ROM and weighted activity  Modifying factors:  resting and non-use makes it better, movement and use makes it worse  Associated signs & symptoms: pain and limited ROM  Previous similar pain: Yes, chronic pain  Treatments to date: Sleeve upper arm brace    Additional history: States he is aware of his osteoarthritis of left shoulder.  Notes left shoulder arthroplasty may be a consideration once he does retire.  Luis F would like to exhaust conservative measures for at least another two years before considering TSA.    Luis F is left hand dominant    Review of Systems:  A 10-point review of systems was obtained and is negative except for as noted in the HPI.       MEDICAL HISTORY  Patient Active Problem List   Diagnosis    Anxiety state    Esophageal reflux    CARDIOVASCULAR SCREENING; LDL GOAL LESS THAN 160    Actinic keratoses    ACP (advance care planning)    Colon polyps    Aneurysm of ascending aorta without rupture (H)       Current Outpatient Medications   Medication Sig Dispense Refill    amLODIPine-valsartan (EXFORGE) 5-160 MG tablet Take 1 tablet by mouth daily 90 tablet 3    Ascorbic Acid (VITAMIN C) 500 MG CHEW       ASPIRIN LOW DOSE 81 MG EC tablet TAKE 1 TABLET BY MOUTH EVERY DAY 90 tablet 1    hydrochlorothiazide (HYDRODIURIL) 25 MG tablet Take 1 tablet (25 mg) by mouth daily 90 tablet 3    ipratropium (ATROVENT) 0.06 % nasal spray Spray 1-2 sprays into both nostrils 2  times daily 15 mL 11    metoprolol succinate ER (TOPROL XL) 50 MG 24 hr tablet Take 1/2 tab daily by mouth 45 tablet 3    multivitamin w/minerals (MULTI-VITAMIN) tablet Take 1 tablet by mouth daily      pantoprazole (PROTONIX) 40 MG EC tablet Take 1 tablet (40 mg) by mouth daily 90 tablet 3    PARoxetine (PAXIL) 10 MG tablet Take 1 tablet (10 mg) by mouth daily Take 1 tab daily 90 tablet 3    vitamin D3 (CHOLECALCIFEROL) 50 mcg (2000 units) tablet Take 1 tablet by mouth daily         No Known Allergies    Family History   Problem Relation Age of Onset    Cancer - colorectal Paternal Grandmother 82    Breast Cancer Paternal Grandmother     Family History Negative Mother         alive age 66    Cancer Father         alive age 71, prostate cancer    Family History Negative Sister         alive age 40    Alzheimer Disease Maternal Aunt     C.A.D. Paternal Uncle     Cancer Brother         renal cell CA       Additional medical/Social/Surgical histories reviewed in [facility] and updated as appropriate.       PHYSICAL EXAM  There were no vitals taken for this visit.    General  - normal appearance, in no obvious distress  Musculoskeletal - Left shoulder  - inspection: normal bone and joint alignment, no obvious deformity, no scapular winging, no AC step-off,   - palpation: Tenderness anterior joint line, normal clavicle, non-tender AC  - ROM:  Shoulder hiking with abduction, painful and limited forward elevation 100, abduction limited and internal rotation to low lumbar. External rotation limited to 45 vs. 70 contralaterally.    - strength: 5/5  strength, 4/5 shoulder strength in abduction due to pain.  5/5 ER and IR  - special tests:  (-) Speed's  (+) Neer  (+) Hawkin's  (+) Layla's mild weakness, +pain, shoulder hiking  (-) [place]'s  (-) apprehension  (-) subscap lift-off  Neuro  - no sensory or motor deficit, grossly normal coordination, normal muscle tone  Skin  - no ecchymosis, erythema, warmth, or induration, no  obvious rash  Psych  - interactive, appropriate, normal mood and affect       IMAGING : XR left shoulder 2 views on 1/19/23. Final results and radiologist's interpretation, available in the [facility] health record. Images were reviewed with the patient/family members in the office today. My personal interpretation of the performed imaging is moderately severe glenohumeral osteoarthritis of left shoulder. Large osteophyte of humeral head.     ASSESSMENT & PLAN  Mr. Gipson is a 64 year old year old male who presents to clinic today with acute on chronic left shoulder pain.    XR and clinical exam consistent with symptomatic glenohumeral osteoarthritis with suspected high grade supraspinatus tearing.    Diagnosis: Glenohumeral osteoarthritis of left shoulder    Surgical vs. Non-surgical options reviewed.  Consideration for MRI prior to surgical discussion to assess rotator cuff status.  We reviewed options together and he wishes to pursue non-surgical care.  Trial of glenohumeral injection decided after risks, benefits, alternatives discussed.  Additional physical therapy referral was provided today.  I do think he would benefit from gaining high-level understanding of dues, don'ts and a strengthening/range of motion program that can benefit him for years to come.    Have recommended returning once his pain does recur in a steady fashion.  At that time we can consider benefit to repeating injection, versus other options.  All questions answered for Luis F.    Glenohumeral Injection - Ultrasound Guided  The patient was informed of the risks and the benefits of the procedure and a written consent was signed.  The patient s left shoulder was prepped with chlorhexidine in sterile fashion.   Local anesthesia was performed using a 27-gauge 1.5-inch needle to administer 3 mL of 1% lidocaine without epi.  40 mg of methylprednisolone suspension was drawn up into a 5 mL syringe with 3 mL of 1% lidocaine w/o Epi.  Injection was  performed using sterile technique.  Under ultrasound guidance a 3.5-inch 22-gauge needle was used to enter the left glenohumeral joint.  Posterior approach was used with the patient in lateral recumbent position, arm in neutral position at the side.  Needle placement was visualized and documented with ultrasound.  Ultrasound visualization was necessary due to the small joint space entered.  Injection performed long axis to the probe.  Injection solution visualized within the joint space.  Images were permanently stored for the patient's record.  There were no complications. The patient tolerated the procedure well. There was negligible bleeding.   Therapy scheduled to follow for mobilization.  The patient was instructed to call or go to the emergency room with any unusual pain, swelling, redness, or if otherwise concerned.  Danyel Peters DO [facility]  Primary Care Sports Medicine  [facility]    It was a pleasure seeing Luis F today.      Large Joint Injection/Arthocentesis: L glenohumeral joint    Date/Time: 11/6/2024 2:09 PM    Performed by: Danyel Peters DO  Authorized by: Danyel Peters DO    Needle Size:  22 G  Guidance: ultrasound    Approach:  Posterior  Location:  Shoulder      Site:  L glenohumeral joint  Medications:  40 mg methylPREDNISolone 40 MG/ML; 4 mL lidocaine 1 %  Outcome:  Tolerated well, no immediate complications  Procedure discussed: discussed risks, benefits, and alternatives    Consent Given by:  Patient  Timeout: timeout called immediately prior to procedure    Prep: patient was prepped and draped in usual sterile fashion     Ultrasound was used to ensure safe and accurate needle placement and injection. Ultrasound images of the procedure were permanently stored.

## 2024-11-06 NOTE — LETTER
11/6/2024      Luis F Gipson  [address] [address] [id number]      Dear Colleague,    Thank you for referring your patient, Luis F Gipson, to the [facility] [place]. Please see a copy of my visit note below.    CHIEF COMPLAINT:  Chronic left shoulder pain    HISTORY OF PRESENT ILLNESS  Mr. Gipson is a pleasant 64 year old year old male who presents to clinic today with left shoulder pain.  Luis F explains that he was seen for his shoulder pain 1 year ago and his pain has progressed since then.  He notes a remote history of left shoulder rotator cuff tear that was treated conservatively.    Onset: gradual  Location: left shoulder  Quality:  stabbing and sharp  Duration: 2-3 years   Severity: 6/10 at worst  Timing: intermittent episodes with ROM and weighted activity  Modifying factors:  resting and non-use makes it better, movement and use makes it worse  Associated signs & symptoms: pain and limited ROM  Previous similar pain: Yes, chronic pain  Treatments to date: Sleeve upper arm brace    Additional history: States he is aware of his osteoarthritis of left shoulder.  Notes left shoulder arthroplasty may be a consideration once he does retire.  Luis F would like to exhaust conservative measures for at least another two years before considering TSA.    Luis F is left hand dominant    Review of Systems:  A 10-point review of systems was obtained and is negative except for as noted in the HPI.       MEDICAL HISTORY  Patient Active Problem List   Diagnosis     Anxiety state     Esophageal reflux     CARDIOVASCULAR SCREENING; LDL GOAL LESS THAN 160     Actinic keratoses     ACP (advance care planning)     Colon polyps     Aneurysm of ascending aorta without rupture (H)       Current Outpatient Medications   Medication Sig Dispense Refill     amLODIPine-valsartan (EXFORGE) 5-160 MG tablet Take 1 tablet by mouth daily 90 tablet 3     Ascorbic Acid (VITAMIN C) 500  MG CHEW        ASPIRIN LOW DOSE 81 MG EC tablet TAKE 1 TABLET BY MOUTH EVERY DAY 90 tablet 1     hydrochlorothiazide (HYDRODIURIL) 25 MG tablet Take 1 tablet (25 mg) by mouth daily 90 tablet 3     ipratropium (ATROVENT) 0.06 % nasal spray Spray 1-2 sprays into both nostrils 2 times daily 15 mL 11     metoprolol succinate ER (TOPROL XL) 50 MG 24 hr tablet Take 1/2 tab daily by mouth 45 tablet 3     multivitamin w/minerals (MULTI-VITAMIN) tablet Take 1 tablet by mouth daily       pantoprazole (PROTONIX) 40 MG EC tablet Take 1 tablet (40 mg) by mouth daily 90 tablet 3     PARoxetine (PAXIL) 10 MG tablet Take 1 tablet (10 mg) by mouth daily Take 1 tab daily 90 tablet 3     vitamin D3 (CHOLECALCIFEROL) 50 mcg (2000 units) tablet Take 1 tablet by mouth daily         No Known Allergies    Family History   Problem Relation Age of Onset     Cancer - colorectal Paternal Grandmother 82     Breast Cancer Paternal Grandmother      Family History Negative Mother         alive age 66     Cancer Father         alive age 71, prostate cancer     Family History Negative Sister         alive age 40     Alzheimer Disease Maternal Aunt      C.A.D. Paternal Uncle      Cancer Brother         renal cell CA       Additional medical/Social/Surgical histories reviewed in [facility] and updated as appropriate.       PHYSICAL EXAM  There were no vitals taken for this visit.    General  - normal appearance, in no obvious distress  Musculoskeletal - Left shoulder  - inspection: normal bone and joint alignment, no obvious deformity, no scapular winging, no AC step-off,   - palpation: Tenderness anterior joint line, normal clavicle, non-tender AC  - ROM:  Shoulder hiking with abduction, painful and limited forward elevation 100, abduction limited and internal rotation to low lumbar. External rotation limited to 45 vs. 70 contralaterally.    - strength: 5/5  strength, 4/5 shoulder strength in abduction due to pain.  5/5 ER and IR  - special tests:  (-)  Speed's  (+) Neer  (+) Hawkin's  (+) Layla's mild weakness, +pain, shoulder hiking  (-) [place]'s  (-) apprehension  (-) subscap lift-off  Neuro  - no sensory or motor deficit, grossly normal coordination, normal muscle tone  Skin  - no ecchymosis, erythema, warmth, or induration, no obvious rash  Psych  - interactive, appropriate, normal mood and affect       IMAGING : XR left shoulder 2 views on 1/19/23. Final results and radiologist's interpretation, available in the [facility] health record. Images were reviewed with the patient/family members in the office today. My personal interpretation of the performed imaging is moderately severe glenohumeral osteoarthritis of left shoulder. Large osteophyte of humeral head.     ASSESSMENT & PLAN  Mr. Gipson is a 64 year old year old male who presents to clinic today with acute on chronic left shoulder pain.    XR and clinical exam consistent with symptomatic glenohumeral osteoarthritis with suspected high grade supraspinatus tearing.    Diagnosis: Glenohumeral osteoarthritis of left shoulder    Surgical vs. Non-surgical options reviewed.  Consideration for MRI prior to surgical discussion to assess rotator cuff status.  We reviewed options together and he wishes to pursue non-surgical care.  Trial of glenohumeral injection decided after risks, benefits, alternatives discussed.  Additional physical therapy referral was provided today.  I do think he would benefit from gaining high-level understanding of dues, don'ts and a strengthening/range of motion program that can benefit him for years to come.    Have recommended returning once his pain does recur in a steady fashion.  At that time we can consider benefit to repeating injection, versus other options.  All questions answered for Luis F.    Glenohumeral Injection - Ultrasound Guided  The patient was informed of the risks and the benefits of the procedure and a written consent was signed.  The patient s left shoulder was  prepped with chlorhexidine in sterile fashion.   Local anesthesia was performed using a 27-gauge 1.5-inch needle to administer 3 mL of 1% lidocaine without epi.  40 mg of methylprednisolone suspension was drawn up into a 5 mL syringe with 3 mL of 1% lidocaine w/o Epi.  Injection was performed using sterile technique.  Under ultrasound guidance a 3.5-inch 22-gauge needle was used to enter the left glenohumeral joint.  Posterior approach was used with the patient in lateral recumbent position, arm in neutral position at the side.  Needle placement was visualized and documented with ultrasound.  Ultrasound visualization was necessary due to the small joint space entered.  Injection performed long axis to the probe.  Injection solution visualized within the joint space.  Images were permanently stored for the patient's record.  There were no complications. The patient tolerated the procedure well. There was negligible bleeding.   Therapy scheduled to follow for mobilization.  The patient was instructed to call or go to the emergency room with any unusual pain, swelling, redness, or if otherwise concerned.  Danyel Peters DO [facility]  Primary Care Sports Medicine  [facility]    It was a pleasure seeing Luis F today.      Again, thank you for allowing me to participate in the care of your patient.        Sincerely,        Danyel Peters DO

## 2024-11-13 RX ORDER — LIDOCAINE HYDROCHLORIDE 10 MG/ML
4 INJECTION, SOLUTION INFILTRATION; PERINEURAL
Status: COMPLETED | OUTPATIENT
Start: 2024-11-06 | End: 2024-11-06

## 2024-11-13 RX ORDER — METHYLPREDNISOLONE ACETATE 40 MG/ML
40 INJECTION, SUSPENSION INTRA-ARTICULAR; INTRALESIONAL; INTRAMUSCULAR; SOFT TISSUE
Status: COMPLETED | OUTPATIENT
Start: 2024-11-06 | End: 2024-11-06

## 2024-12-19 DIAGNOSIS — Z13.6 CARDIOVASCULAR SCREENING; LDL GOAL LESS THAN 160: ICD-10-CM

## 2024-12-19 RX ORDER — ASPIRIN 81 MG/1
81 TABLET, COATED ORAL DAILY
Qty: 90 TABLET | Refills: 0 | Status: SHIPPED | OUTPATIENT
Start: 2024-12-19

## 2025-02-12 ENCOUNTER — LAB (OUTPATIENT)
Dept: LAB | Facility: CLINIC | Age: 65
End: 2025-02-12
Payer: COMMERCIAL

## 2025-02-12 DIAGNOSIS — E87.1 HYPONATREMIA: ICD-10-CM

## 2025-02-12 PROCEDURE — 36415 COLL VENOUS BLD VENIPUNCTURE: CPT

## 2025-02-12 PROCEDURE — 80048 BASIC METABOLIC PNL TOTAL CA: CPT

## 2025-02-13 LAB
ANION GAP SERPL CALCULATED.3IONS-SCNC: 13 MMOL/L (ref 7–15)
BUN SERPL-MCNC: 20.7 MG/DL (ref 8–23)
CALCIUM SERPL-MCNC: 9.4 MG/DL (ref 8.8–10.4)
CHLORIDE SERPL-SCNC: 98 MMOL/L (ref 98–107)
CREAT SERPL-MCNC: 1.12 MG/DL (ref 0.67–1.17)
EGFRCR SERPLBLD CKD-EPI 2021: 72 ML/MIN/1.73M2
GLUCOSE SERPL-MCNC: 88 MG/DL (ref 70–99)
HCO3 SERPL-SCNC: 24 MMOL/L (ref 22–29)
POTASSIUM SERPL-SCNC: 4.4 MMOL/L (ref 3.4–5.3)
SODIUM SERPL-SCNC: 135 MMOL/L (ref 135–145)

## 2025-02-19 ENCOUNTER — MYC MEDICAL ADVICE (OUTPATIENT)
Dept: INTERNAL MEDICINE | Facility: CLINIC | Age: 65
End: 2025-02-19
Payer: COMMERCIAL

## 2025-02-19 DIAGNOSIS — Z13.6 CARDIOVASCULAR SCREENING; LDL GOAL LESS THAN 100: ICD-10-CM

## 2025-02-19 DIAGNOSIS — I10 HYPERTENSION, UNSPECIFIED TYPE: ICD-10-CM

## 2025-02-19 DIAGNOSIS — Z12.5 SCREENING FOR PROSTATE CANCER: ICD-10-CM

## 2025-03-11 ENCOUNTER — PATIENT OUTREACH (OUTPATIENT)
Dept: CARE COORDINATION | Facility: CLINIC | Age: 65
End: 2025-03-11
Payer: COMMERCIAL

## 2025-03-23 DIAGNOSIS — Z13.6 CARDIOVASCULAR SCREENING; LDL GOAL LESS THAN 160: ICD-10-CM

## 2025-03-24 RX ORDER — ASPIRIN 81 MG/1
81 TABLET, COATED ORAL DAILY
Qty: 90 TABLET | Refills: 0 | Status: SHIPPED | OUTPATIENT
Start: 2025-03-24

## 2025-03-25 ENCOUNTER — MYC MEDICAL ADVICE (OUTPATIENT)
Dept: INTERNAL MEDICINE | Facility: CLINIC | Age: 65
End: 2025-03-25
Payer: COMMERCIAL

## 2025-03-25 DIAGNOSIS — F41.9 ANXIETY: ICD-10-CM

## 2025-03-26 RX ORDER — PAROXETINE 10 MG/1
TABLET, FILM COATED ORAL
Qty: 180 TABLET | Refills: 0 | Status: SHIPPED | OUTPATIENT
Start: 2025-03-26

## 2025-03-26 NOTE — TELEPHONE ENCOUNTER
Please see mychart from patient and advise appropriate course of action.    Azucena Hawk RN  [facility] Triage Nurse

## 2025-05-18 DIAGNOSIS — I10 HYPERTENSION, UNSPECIFIED TYPE: ICD-10-CM

## 2025-05-18 DIAGNOSIS — K21.9 GASTROESOPHAGEAL REFLUX DISEASE WITHOUT ESOPHAGITIS: ICD-10-CM

## 2025-05-18 DIAGNOSIS — F41.9 ANXIETY: ICD-10-CM

## 2025-05-19 RX ORDER — PANTOPRAZOLE SODIUM 40 MG/1
40 TABLET, DELAYED RELEASE ORAL DAILY
Qty: 90 TABLET | Refills: 0 | Status: SHIPPED | OUTPATIENT
Start: 2025-05-19

## 2025-05-20 RX ORDER — AMLODIPINE AND VALSARTAN 5; 160 MG/1; MG/1
1 TABLET ORAL DAILY
Qty: 90 TABLET | Refills: 0 | Status: SHIPPED | OUTPATIENT
Start: 2025-05-20

## 2025-05-20 RX ORDER — METOPROLOL SUCCINATE 50 MG/1
25 TABLET, EXTENDED RELEASE ORAL
Qty: 45 TABLET | Refills: 0 | Status: SHIPPED | OUTPATIENT
Start: 2025-05-20

## 2025-05-20 RX ORDER — HYDROCHLOROTHIAZIDE 25 MG/1
25 TABLET ORAL DAILY
Qty: 90 TABLET | Refills: 0 | Status: SHIPPED | OUTPATIENT
Start: 2025-05-20

## 2025-06-16 ENCOUNTER — RESULTS FOLLOW-UP (OUTPATIENT)
Dept: INTERNAL MEDICINE | Facility: CLINIC | Age: 65
End: 2025-06-16

## 2025-06-16 ENCOUNTER — OFFICE VISIT (OUTPATIENT)
Dept: INTERNAL MEDICINE | Facility: CLINIC | Age: 65
End: 2025-06-16
Payer: COMMERCIAL

## 2025-06-16 VITALS
HEIGHT: 71 IN | HEART RATE: 74 BPM | OXYGEN SATURATION: 98 % | WEIGHT: 210.8 LBS | SYSTOLIC BLOOD PRESSURE: 122 MMHG | TEMPERATURE: 97.4 F | RESPIRATION RATE: 16 BRPM | DIASTOLIC BLOOD PRESSURE: 70 MMHG | BODY MASS INDEX: 29.51 KG/M2

## 2025-06-16 DIAGNOSIS — F41.9 ANXIETY: ICD-10-CM

## 2025-06-16 DIAGNOSIS — I10 HYPERTENSION, UNSPECIFIED TYPE: ICD-10-CM

## 2025-06-16 DIAGNOSIS — Z00.00 MEDICARE ANNUAL WELLNESS VISIT, SUBSEQUENT: Primary | ICD-10-CM

## 2025-06-16 DIAGNOSIS — G89.29 CHRONIC PAIN OF BOTH SHOULDERS: ICD-10-CM

## 2025-06-16 DIAGNOSIS — K21.9 GASTROESOPHAGEAL REFLUX DISEASE WITHOUT ESOPHAGITIS: ICD-10-CM

## 2025-06-16 DIAGNOSIS — M25.512 CHRONIC PAIN OF BOTH SHOULDERS: ICD-10-CM

## 2025-06-16 DIAGNOSIS — M25.511 CHRONIC PAIN OF BOTH SHOULDERS: ICD-10-CM

## 2025-06-16 DIAGNOSIS — I71.21 ANEURYSM OF ASCENDING AORTA WITHOUT RUPTURE: ICD-10-CM

## 2025-06-16 DIAGNOSIS — R91.8 PULMONARY NODULES: ICD-10-CM

## 2025-06-16 DIAGNOSIS — Z86.0100 HISTORY OF COLONIC POLYPS: ICD-10-CM

## 2025-06-16 PROCEDURE — 3074F SYST BP LT 130 MM HG: CPT | Performed by: INTERNAL MEDICINE

## 2025-06-16 PROCEDURE — G0439 PPPS, SUBSEQ VISIT: HCPCS | Mod: GC | Performed by: INTERNAL MEDICINE

## 2025-06-16 PROCEDURE — 99214 OFFICE O/P EST MOD 30 MIN: CPT | Mod: 25 | Performed by: INTERNAL MEDICINE

## 2025-06-16 PROCEDURE — G2211 COMPLEX E/M VISIT ADD ON: HCPCS | Performed by: INTERNAL MEDICINE

## 2025-06-16 PROCEDURE — 3078F DIAST BP <80 MM HG: CPT | Performed by: INTERNAL MEDICINE

## 2025-06-16 RX ORDER — PAROXETINE 10 MG/1
TABLET, FILM COATED ORAL
Qty: 90 TABLET | Refills: 3 | Status: SHIPPED | OUTPATIENT
Start: 2025-06-16

## 2025-06-16 RX ORDER — PANTOPRAZOLE SODIUM 40 MG/1
40 TABLET, DELAYED RELEASE ORAL DAILY
Qty: 90 TABLET | Refills: 3 | Status: SHIPPED | OUTPATIENT
Start: 2025-06-16

## 2025-06-16 RX ORDER — HYDROCHLOROTHIAZIDE 25 MG/1
25 TABLET ORAL DAILY
Qty: 90 TABLET | Refills: 3 | Status: SHIPPED | OUTPATIENT
Start: 2025-06-16

## 2025-06-16 RX ORDER — AMLODIPINE AND VALSARTAN 5; 160 MG/1; MG/1
1 TABLET ORAL DAILY
Qty: 90 TABLET | Refills: 3 | Status: SHIPPED | OUTPATIENT
Start: 2025-06-16

## 2025-06-16 RX ORDER — METOPROLOL SUCCINATE 50 MG/1
25 TABLET, EXTENDED RELEASE ORAL DAILY
Qty: 45 TABLET | Refills: 3 | Status: SHIPPED | OUTPATIENT
Start: 2025-06-16

## 2025-06-16 SDOH — HEALTH STABILITY: PHYSICAL HEALTH: ON AVERAGE, HOW MANY DAYS PER WEEK DO YOU ENGAGE IN MODERATE TO STRENUOUS EXERCISE (LIKE A BRISK WALK)?: 4 DAYS

## 2025-06-16 ASSESSMENT — SOCIAL DETERMINANTS OF HEALTH (SDOH): HOW OFTEN DO YOU GET TOGETHER WITH FRIENDS OR RELATIVES?: THREE TIMES A WEEK

## 2025-06-16 NOTE — PATIENT INSTRUCTIONS
Continue current medications  Prescriptions refilled on file at your pharmacy to be filled in the future when needed  Reduce calorie/carbohydrate (sugar, bread, potato, pasta, rice, alcohol etc)  intake in diet.  Increase color on your plate with vegetables. Increase  frequency of walking or other aerobic exercise as able (goal is daily)  CT Chest. Griselda Enriquez will call you to schedule. If you have not heard from their schedulers within 2 business days, then call [phone] (radiology scheduling)  Referral to Orthopedics. Central schedulers will contact you to schedule an appointment or you may contact the [facility] Team at [phone].   Schedule an eye exam appointment. Please ask the eye clinic to fax us a report of your eye exam to [phone], attention Dr Boykin  I would recommend  a  Prevnar 20 vaccine (pneumonia risk reduction). Get at a pharmacy  Colonoscopy  with  [facility] in October. Call  them at [phone] to schedule the procedure.   Healthcare  Directive discussed and given a copy.    Complete the directive and then bring it back to the [facility] Internal Medicine dept  to be scanned into your chart or download a copy into [employer]  and send to us in a [employer] message  Pt was informed regarding extra E&M billing for management of new or established medical issues not related to today's wellness/screening visit

## 2025-06-16 NOTE — PROGRESS NOTES
Preventive Care Visit  [facility]  Ronald Boykin MD, Internal Medicine  Jun 16, 2025       ASSESSMENT:    1. Medicare annual wellness visit, subsequent (Primary)  Patient declines COVID/influenza vaccinations.  Due for tetanus vaccination in 2027.  Discussed Prevnar 20 vaccination.  Colon cancer screening as below.  Due for eye exam.  Other healthcare maintenance up-to-date for age.  Counseled regarding reducing alcohol intake by half    2. Aneurysm of ascending aorta without rupture  Previous stable 4.4 cm ascending thoracic aortic aneurysm.  Needs follow-up imaging   - CT Chest w Contrast; Future    3. Pulmonary nodules  Previous stable 6 mm lung nodule in the left upper lobe abutting the distal thoracic aortic arch.  Needs follow-up  - CT Chest w Contrast; Future    4. Chronic pain of both shoulders   L>R.  Previous good response to cortisone injection.  Patient to see Ortho for follow-up management.  Patient overusing left shoulder to compensate for previous right shoulder issues  - Orthopedic  Referral; Future    5. Anxiety  Controlled per patient.  Continue current medical management  - PARoxetine (PAXIL) 10 MG tablet; Take one tab  by mouth daily for anxiety  Dispense: 90 tablet; Refill: 3  - metoprolol succinate ER (TOPROL XL) 50 MG 24 hr tablet; Take 0.5 tablets (25 mg) by mouth daily.  Dispense: 45 tablet; Refill: 3    6. Hypertension, unspecified type  Controlled.  Continue current medication  - amLODIPine-valsartan (EXFORGE) 5-160 MG tablet; Take 1 tablet by mouth daily.  Dispense: 90 tablet; Refill: 3  - hydrochlorothiazide (HYDRODIURIL) 25 MG tablet; Take 1 tablet (25 mg) by mouth daily.  Dispense: 90 tablet; Refill: 3  - metoprolol succinate ER (TOPROL XL) 50 MG 24 hr tablet; Take 0.5 tablets (25 mg) by mouth daily.  Dispense: 45 tablet; Refill: 3    7. Gastroesophageal reflux disease without esophagitis  Controlled.  Has recurrence of symptoms off of PPI therapy.   Continue pantoprazole counseled regarding limiting caffeine intake and reducing alcohol intake  - pantoprazole (PROTONIX) 40 MG EC tablet; Take 1 tablet (40 mg) by mouth daily.  Dispense: 90 tablet; Refill: 3    8. History of colonic polyps  History colon polyps.  Due for follow-up colonoscopy October 2025.  Patient prefers [facility]  - Colonoscopy Screening  Referral; Future      PLAN:  Continue current medications  Prescriptions refilled on file at your pharmacy to be filled in the future when needed  Reduce calorie/carbohydrate (sugar, bread, potato, pasta, rice, alcohol etc)  intake in diet.  Increase color on your plate with vegetables. Increase  frequency of walking or other aerobic exercise as able (goal is daily)  CT Chest. [facility] will call you to schedule. If you have not heard from their schedulers within 2 business days, then call [phone] (radiology scheduling)  Referral to Orthopedics. Central schedulers will contact you to schedule an appointment or you may contact the  Team at [phone].   Schedule an eye exam appointment. Please ask the eye clinic to fax us a report of your eye exam to [phone], attention Dr Boykin  I would recommend  a  Prevnar 20 vaccine (pneumonia risk reduction). Get at a pharmacy  Colonoscopy  with  [facility] in October. Call  them at [phone] to schedule the procedure.   Healthcare  Directive discussed and given a copy.    Complete the directive and then bring it back to the [facility] Internal Medicine dept  to be scanned into your chart or download a copy into [employer]  and send to us in a [employer] message  Pt was informed regarding extra E&M billing for management of new or established medical issues not related to today's wellness/screening visit           Hernan Kerns is a 65 year old, presenting for the following:  Wellness Visit  And follow-up medical issues as above         HPI     Advance Care  Planning  Patient does not have a healthcare directive.  Discussed form with patient and given copy to complete and return to clinic          6/16/2025   General Health   How would you rate your overall physical health? Good   Feel stress (tense, anxious, or unable to sleep) Not at all         6/16/2025   Nutrition   Diet: Regular (no restrictions)         6/16/2025   Exercise   Days per week of moderate/strenous exercise 4 days         6/16/2025   Social Factors   Frequency of gathering with friends or relatives Three times a week   Worry food won't last until get money to buy more No   Food not last or not have enough money for food? No   Do you have housing? (Housing is defined as stable permanent housing and does not include staying outside in a car, in a tent, in an abandoned building, in an overnight shelter, or couch-surfing.) Yes   Are you worried about losing your housing? No   Lack of transportation? No   Unable to get utilities (heat,electricity)? No         6/16/2025   Fall Risk   Fallen 2 or more times in the past year? No   Trouble with walking or balance? No          6/16/2025   Activities of Daily Living- Home Safety   Needs help with the following daily activites None of the above   Safety concerns in the home None of the above         6/16/2025   Dental   Dentist two times every year? (!) NO         6/16/2025   Hearing Screening   Hearing concerns? None of the above         6/16/2025   Driving Risk Screening   Patient/family members have concerns about driving No         6/16/2025   General Alertness/Fatigue Screening   Have you been more tired than usual lately? No         6/16/2025   Urinary Incontinence Screening   Bothered by leaking urine in past 6 months No         Today's PHQ-2 Score:       6/16/2025     1:35 PM   PHQ-2 ( 1999 Pf[employer])   Q1: Little interest or pleasure in doing things 0   Q2: Feeling down, depressed or hopeless 0   PHQ-2 Score 0    Q1: Little interest or pleasure in doing  things Not at all   Q2: Feeling down, depressed or hopeless Not at all   PHQ-2 Score 0       Patient-reported           2025   Substance Use   Alcohol more than 3/day or more than 7/wk Yes   How often do you have a drink containing alcohol 2 to 3 times a week   How many alcohol drinks on typical day 3 or 4   How often do you have 5+ drinks at one occasion Monthly   Audit 2/3 Score 3   How often not able to stop drinking once started Never   How often failed to do what normally expected Never   How often needed first drink in am after a heavy drinking session Never   How often feeling of guilt or remorse after drinking Never   How often unable to remember what happened the night before Never   Have you or someone else been injured because of your drinking No   Has anyone been concerned or suggested you cut down on drinking No   TOTAL SCORE - AUDIT 6   Do you have a current opioid prescription? No   How severe/bad is pain from 1 to 10? 5/10   Do you use any other substances recreationally? No     Social History     Tobacco Use    Smoking status: Former     Current packs/day: 0.00     Types: Cigarettes     Quit date: 3/27/2009     Years since quittin.2    Smokeless tobacco: Never    Tobacco comments:     2-3 daily   Vaping Use    Vaping status: Never Used   Substance Use Topics    Alcohol use: Yes     Comment: 2 drinks 3-4 days/week    Drug use: No            2025   AAA Screening   Family history of Abdominal Aortic Aneurysm (AAA)? No   Last PSA:   PSA   Date Value Ref Range Status   2020 1.07 0 - 4 ug/L Final     Comment:     Assay Method:  Chemiluminescence using Siemens Vista analyzer     Prostate Specific Antigen Screen   Date Value Ref Range Status   2025 1.18 0.00 - 4.50 ng/mL Final   2021 1.12 ug/L Final     ASCVD Risk   The 10-year ASCVD risk score (Roscoe BOWMAN, et al., 2019) is: 16.2%    Values used to calculate the score:      Age: 65 years      Sex: Male      Is  Non- : No      Diabetic: No      Tobacco smoker: No      Systolic Blood Pressure: 133 mmHg      Is BP treated: Yes      HDL Cholesterol: 53 mg/dL      Total Cholesterol: 174 mg/dL       Current providers sharing in care for this patient include:  Patient Care Team:  Ronald Boykin MD as PCP - General  Ronald Boykin MD as Assigned PCP  Omar Fatima MD as MD (Neurology)  Omar Faitma MD as Assigned Neuroscience Provider  aDnyel Peters DO as Assigned Musculoskeletal Provider    The following health maintenance items are reviewed in Epic and correct as of today:  Health Maintenance   Topic Date Due    PNEUMOCOCCAL VACCINE 50+ YEARS (1 of 1 - PCV) Never done    RSV VACCINE (1 - Risk 60-74 years 1-dose series) Never done    ANNUAL REVIEW OF HM ORDERS  08/10/2024    COVID-19 VACCINE (4 - 2024-25 season) 09/01/2024    MEDICARE ANNUAL WELLNESS VISIT  04/10/2025    INFLUENZA VACCINE (Season Ended) 09/01/2025    COLORECTAL CANCER SCREENING  10/14/2025    BMP  06/13/2026    FALL RISK ASSESSMENT  06/16/2026    DTAP/TDAP/TD VACCINE (2 - Td or Tdap) 08/17/2027    DIABETES SCREENING  06/13/2028    ADVANCE CARE PLANNING  04/10/2029    LIPID  06/13/2030    HEPATITIS C SCREENING  Completed    PHQ-2 (once per calendar year)  Completed    ZOSTER VACCINE  Completed    AORTIC ANEURYSM SCREENING (SYSTEM ASSIGNED)  Completed    HPV VACCINE  Aged Out    MENINGITIS VACCINE  Aged Out    LUNG CANCER SCREENING  Discontinued     Component      Latest Ref Rng 5/3/2024  8:53 AM 2/12/2025  11:40 AM 6/13/2025  8:35 AM   Sodium      135 - 145 mmol/L 132 (L)  135  132 (L)    Potassium      3.4 - 5.3 mmol/L 4.6  4.4  4.6    Carbon Dioxide (CO2)      22 - 29 mmol/L 27  24  26    Anion Gap      7 - 15 mmol/L 10  13  10    Urea Nitrogen      8.0 - 23.0 mg/dL 14.4  20.7  15.7    Creatinine      0.67 - 1.17 mg/dL 1.02  1.12  1.08    GFR Estimate      >60 mL/min/1.73m2 81  72  75    Calcium       8.8 - 10.4 mg/dL 9.5  9.4  9.5    Chloride      98 - 107 mmol/L 95 (L)  98  96 (L)    Glucose      70 - 99 mg/dL 99  88  106 (H)    Alkaline Phosphatase      40 - 150 U/L 55   54    AST      0 - 45 U/L 23   22    ALT      0 - 70 U/L 24   20    Protein Total      6.4 - 8.3 g/dL 6.8   6.8    Albumin      3.5 - 5.2 g/dL 4.8   4.5    Bilirubin Total      <=1.2 mg/dL 1.2   1.5 (H)    Patient Fasting? Yes   Yes    Patient Fasting?   Yes    WBC      4.0 - 11.0 10e3/uL   5.4    RBC Count      4.40 - 5.90 10e6/uL   4.72    Hemoglobin      13.3 - 17.7 g/dL   15.9    Hematocrit      40.0 - 53.0 %   45.9    MCV      78 - 100 fL   97    MCH      26.5 - 33.0 pg   33.7 (H)    MCHC      31.5 - 36.5 g/dL   34.6    RDW      10.0 - 15.0 %   11.9    Platelet Count      150 - 450 10e3/uL   315    Cholesterol      <200 mg/dL 166   174    Triglycerides      <150 mg/dL 180 (H)   129    HDL Cholesterol      >=40 mg/dL 50   53    LDL Cholesterol Calculated      <100 mg/dL 80   95    Non HDL Cholesterol      <130 mg/dL 116   121    Prostate Specific Antigen Screen      0.00 - 4.50 ng/mL 0.96   1.18       Legend:  (L) Low  (H) High    Review of Systems  CONSTITUTIONAL: NEGATIVE for fever, chills. Weight up 8 pounds  INTEGUMENTARY/SKIN: NEGATIVE for worrisome rashes, moles or lesions  EYES: NEGATIVE for vision changes or irritation. Eye exam due  ENT/MOUTH: NEGATIVE for ear, mouth and throat problems  RESP: NEGATIVE for significant cough or SOB  CV: NEGATIVE for chest pain, palpitations or peripheral edema  GI: NEGATIVE for nausea, abdominal pain, heartburn, or change in bowel habits  : NEGATIVE for frequency, dysuria, or hematuria  MUSCULOSKELETAL:  POSITIVE for hx left shoulder pain.  Last cortisone Nov 2024 and wearing off and  would like a repeat injection. Occ right shoulder pain too  NEURO: NEGATIVE for weakness, dizziness or paresthesias  ENDOCRINE: NEGATIVE for temperature intolerance, skin/hair changes  HEME: NEGATIVE for bleeding  "problems  PSYCHIATRIC:  POSITIVE for stable anxiety controlled with medication therapy     Objective    Exam  /80   Pulse 95   Temp 97.4  F (36.3  C) (Temporal)   Resp 16   Ht 1.803 m (5' 11\")   Wt 95.6 kg (210 lb 12.8 oz)   SpO2 98%   BMI 29.40 kg/m     Estimated body mass index is 29.4 kg/m  as calculated from the following:    Height as of this encounter: 1.803 m (5' 11\").    Weight as of this encounter: 95.6 kg (210 lb 12.8 oz).    Physical Exam  General appearance - healthy, alert, no distress  Skin - No rashes or lesions.  Head - normocephalic, atraumatic  Eyes - VIVEK, EOMI, fundi exam with nondilated pupils negative.  Ears - External ears normal. Canals clear. TM's normal.  Nose/Sinuses - Nares normal. Septum midline. Mucosa normal. No drainage or sinus tenderness.  Oropharynx - No erythema, no adenopathy, no exudates.  Neck - Supple without adenopathy or thyromegaly. No bruits.  Lungs - Clear to auscultation without wheezes/rhonchi.  Heart - Regular rate and rhythm without murmurs, clicks, or gallops.  Nodes - No supraclavicular, axillary, or inguinal adenopathy palpable.  Abdomen - Abdomen soft, non-tender. BS normal. No masses or hepatosplenomegaly palpable. No bruits.  Extremities -No cyanosis, clubbing or edema.    Musculoskeletal - Spine ROM normal. Muscular strength intact.  Tenderness to abduction left shoulder beyond 90 degrees.  No tenderness to internal/external rotation  Peripheral pulses - radial=4/4, femoral=4/4, posterior tibial=4/4, dorsalis pedis=4/4,  Neuro - Gait normal.  Sensation grossly WNL.  Genital - Normal-appearing male external genitalia. No scrotal masses or inguinal hernia palpable.   Rectal - deferred          6/16/2025   Mini Cog   Clock Draw Score 2 Normal   3 Item Recall 3 objects recalled   Mini Cog Total Score 5             4/10/2024   Vision Screen   Vision Screen Results Pass    Pass   No Corrective Lenses, PLUS LENS REQUIRED Pass       Multiple values from " one day are sorted in reverse-chronological order       Signed Electronically by: Ronald Boykin MD

## 2025-06-17 ENCOUNTER — PATIENT OUTREACH (OUTPATIENT)
Dept: CARE COORDINATION | Facility: CLINIC | Age: 65
End: 2025-06-17
Payer: COMMERCIAL

## 2025-06-19 ENCOUNTER — PATIENT OUTREACH (OUTPATIENT)
Dept: CARE COORDINATION | Facility: CLINIC | Age: 65
End: 2025-06-19
Payer: COMMERCIAL

## 2025-06-19 DIAGNOSIS — Z13.6 CARDIOVASCULAR SCREENING; LDL GOAL LESS THAN 160: ICD-10-CM

## 2025-06-19 PROBLEM — M25.512 CHRONIC PAIN OF BOTH SHOULDERS: Status: ACTIVE | Noted: 2025-06-19

## 2025-06-19 PROBLEM — F41.9 ANXIETY: Status: ACTIVE | Noted: 2025-06-19

## 2025-06-19 PROBLEM — I10 HYPERTENSION, UNSPECIFIED TYPE: Status: ACTIVE | Noted: 2025-06-19

## 2025-06-19 PROBLEM — Z86.0100 HISTORY OF COLONIC POLYPS: Status: ACTIVE | Noted: 2025-06-19

## 2025-06-19 PROBLEM — M25.511 CHRONIC PAIN OF BOTH SHOULDERS: Status: ACTIVE | Noted: 2025-06-19

## 2025-06-19 PROBLEM — G89.29 CHRONIC PAIN OF BOTH SHOULDERS: Status: ACTIVE | Noted: 2025-06-19

## 2025-06-19 PROBLEM — R91.8 PULMONARY NODULES: Status: ACTIVE | Noted: 2025-06-19

## 2025-06-19 RX ORDER — ASPIRIN 81 MG/1
81 TABLET, COATED ORAL DAILY
Qty: 90 TABLET | Refills: 3 | Status: SHIPPED | OUTPATIENT
Start: 2025-06-19

## 2025-07-10 ENCOUNTER — ANCILLARY PROCEDURE (OUTPATIENT)
Dept: CT IMAGING | Facility: CLINIC | Age: 65
End: 2025-07-10
Attending: INTERNAL MEDICINE
Payer: COMMERCIAL

## 2025-07-10 DIAGNOSIS — R91.8 PULMONARY NODULES: ICD-10-CM

## 2025-07-10 DIAGNOSIS — I71.21 ANEURYSM OF ASCENDING AORTA WITHOUT RUPTURE: ICD-10-CM

## 2025-07-10 PROCEDURE — 250N000011 HC RX IP 250 OP 636: Performed by: INTERNAL MEDICINE

## 2025-07-10 PROCEDURE — 71260 CT THORAX DX C+: CPT

## 2025-07-10 PROCEDURE — 250N000009 HC RX 250: Performed by: INTERNAL MEDICINE

## 2025-07-10 RX ORDER — IOPAMIDOL 755 MG/ML
81 INJECTION, SOLUTION INTRAVASCULAR ONCE
Status: COMPLETED | OUTPATIENT
Start: 2025-07-10 | End: 2025-07-10

## 2025-07-10 RX ADMIN — IOPAMIDOL 81 ML: 755 INJECTION, SOLUTION INTRAVENOUS at 09:03

## 2025-07-10 RX ADMIN — SODIUM CHLORIDE 50 ML: 9 INJECTION, SOLUTION INTRAVENOUS at 09:03

## 2025-07-16 ENCOUNTER — OFFICE VISIT (OUTPATIENT)
Dept: ORTHOPEDICS | Facility: CLINIC | Age: 65
End: 2025-07-16
Attending: INTERNAL MEDICINE
Payer: COMMERCIAL

## 2025-07-16 DIAGNOSIS — G89.29 CHRONIC PAIN OF BOTH SHOULDERS: ICD-10-CM

## 2025-07-16 DIAGNOSIS — M19.012 OSTEOARTHRITIS OF GLENOHUMERAL JOINT, LEFT: ICD-10-CM

## 2025-07-16 DIAGNOSIS — M25.511 CHRONIC PAIN OF BOTH SHOULDERS: ICD-10-CM

## 2025-07-16 DIAGNOSIS — M67.911 TENDINOPATHY OF ROTATOR CUFF, RIGHT: Primary | ICD-10-CM

## 2025-07-16 DIAGNOSIS — M25.512 CHRONIC PAIN OF BOTH SHOULDERS: ICD-10-CM

## 2025-07-16 RX ORDER — LIDOCAINE HYDROCHLORIDE 10 MG/ML
4 INJECTION, SOLUTION INFILTRATION; PERINEURAL
Status: COMPLETED | OUTPATIENT
Start: 2025-07-16 | End: 2025-07-16

## 2025-07-16 RX ORDER — TRIAMCINOLONE ACETONIDE 40 MG/ML
40 INJECTION, SUSPENSION INTRA-ARTICULAR; INTRAMUSCULAR
Status: COMPLETED | OUTPATIENT
Start: 2025-07-16 | End: 2025-07-16

## 2025-07-16 RX ORDER — METHYLPREDNISOLONE ACETATE 40 MG/ML
40 INJECTION, SUSPENSION INTRA-ARTICULAR; INTRALESIONAL; INTRAMUSCULAR; SOFT TISSUE
Status: COMPLETED | OUTPATIENT
Start: 2025-07-16 | End: 2025-07-16

## 2025-07-16 RX ADMIN — TRIAMCINOLONE ACETONIDE 40 MG: 40 INJECTION, SUSPENSION INTRA-ARTICULAR; INTRAMUSCULAR at 14:33

## 2025-07-16 RX ADMIN — LIDOCAINE HYDROCHLORIDE 4 ML: 10 INJECTION, SOLUTION INFILTRATION; PERINEURAL at 14:33

## 2025-07-16 RX ADMIN — METHYLPREDNISOLONE ACETATE 40 MG: 40 INJECTION, SUSPENSION INTRA-ARTICULAR; INTRALESIONAL; INTRAMUSCULAR; SOFT TISSUE at 14:33

## 2025-07-16 NOTE — PROGRESS NOTES
ESTABLISHED PATIENT FOLLOW-UP:  Pain of the Left Shoulder     HISTORY OF PRESENT ILLNESS  Mr. Gipson is a pleasant 65 year old year old male who presents to clinic today for follow-up of left shoulder pain.     Date of injury/onset: Chronic  Date last seen: 1//6/2024  Following Therapeutic Plan: Cortisone Injection - Left  Pain: Return - 8 months post injection  Function: Continues to work, dealing with pain  Interval History: Patient previously had a left shoulder/GH CS Injection and now is experiencing pain in both shoulders. His pain is similar to previously on the left side but the right side appears to be more musc     Additional medical/Social/Surgical histories reviewed in [facility] and updated as appropriate.    REVIEW OF SYSTEMS (7/16/2025)  CONSTITUTIONAL: Denies fever and weight loss  GASTROINTESTINAL: Denies abdominal pain, nausea, vomiting  MUSCULOSKELETAL: See HPI  SKIN: Denies any recent rash or lesion  NEUROLOGICAL: Denies numbness or focal weakness     PHYSICAL EXAM  There were no vitals taken for this visit.    General  - normal appearance, in no obvious distress  Musculoskeletal - right shoulder  - inspection: normal bone and joint alignment, no obvious deformity, no scapular winging, no AC step-off  - palpation: mildly tender RC insertion, normal clavicle, non-tender AC  - ROM:  painful and limited flexion and ER at end range, limited IR  - strength: 5/5  strength, 5/5 in all shoulder planes  - special tests:  (-) Speed's  (+) Neer  (+) Hawkin's  (+) Layla's  (-) Grove's  (-) apprehension  (-) subscap lift-off  Neuro  - no sensory or motor deficit, grossly normal coordination, normal muscle tone  Skin  - no ecchymosis, erythema, warmth, or induration, no obvious rash  Psych  - interactive, appropriate, normal mood and affect       IMAGING : Left shoulder xray 4 views. Final results and radiologist's interpretation, available in the [facility] health record. Images were reviewed with the  patient/family members in the office today. My personal interpretation of the performed imaging is severe osteoarthritic changes of glenohumeral joint of the left shoulder.     ASSESSMENT & PLAN  Mr. Gipson is a 65 year old year old male who presents to clinic today for follow-up regarding recurring left shoulder pain secondary to severe glenohumeral change.  Additionally he notes increased lateral sided right shoulder pain as he is favoring his contralateral shoulder.      Clinical examination of his right shoulder most consistent with rotator cuff tendinitis.      Diagnosis:   Rotator cuff tendinopathy right shoulder  Glenohumeral osteoarthritis left shoulder    -Repeat glenohumeral injection for left shoulder as last CSI provided 7-8 months of relief. Future consideration for TSA and wishes to avoid at this time.  -Subacromial injection discussed in addition to activity modification, HEP, and future option for PT. He wishes to proceed with injection for right shoulder  -Follow up PRN if left shoulder pain recurs in 6-9 months or if incomplete relief for right shoulder. Will pursue imaging right shoulder if incomplete relief.    Right Subacromial Bursa - Ultrasound Guided  The patient was informed of the risks and the benefits of the procedure and a written consent was signed.  The patient s right shoulder was prepped with chlorhexidine in sterile fashion.   40 mg of triamcinolone suspension was drawn up into a 5 mL syringe with 4 mL of 1% lidocaine.  Injection was performed using sterile technique.  Under ultrasound guidance a 1.5-inch 22-gauge needle was used to enter the subacromial bursa.  Anterolateral approach was used with arm held in Crass position.  Needle placement was visualized and documented with ultrasound.  Ultrasound visualization was necessary to ensure placement in to the bursa and not the rotator cuff tendon which could potentially cause further tendon damage.  Injection performed long axis  to the probe.  Injection solution visualized within the bursal space.  Images were permanently stored for the patient's record.  There were no complications. The patient tolerated the procedure well. There was negligible bleeding. Handout provided detailing post-injection instructions.  Glenohumeral Injection - Ultrasound Guided  The patient was informed of the risks and the benefits of the procedure and a written consent was signed.  The patient s right shoulder was prepped with chlorhexidine in sterile fashion.   Local anesthesia was performed using a 27-gauge 1.5-inch needle to administer 3 mL of 1% lidocaine without epi.  40 mg of triamcinolone suspension was drawn up into a 5 mL syringe with 3 mL of 1% lidocaine w/o Epi.  Injection was performed using sterile technique.  Under ultrasound guidance a 3.5-inch 22-gauge needle was used to enter the right glenohumeral joint.  Posterior approach was used with the patient in lateral recumbent position, arm in neutral position at the side.  Needle placement was visualized and documented with ultrasound.  Ultrasound visualization was necessary due to the small joint space entered.  Injection performed long axis to the probe.  Injection solution visualized within the joint space.  Images were permanently stored for the patient's record.  There were no complications. The patient tolerated the procedure well. There was negligible bleeding.   Therapy scheduled to follow for mobilization.  The patient was instructed to call or go to the emergency room with any unusual pain, swelling, redness, or if otherwise concerned.  Danyel Peters DO [facility]  Primary Care Sports Medicine  [facility]      Large Joint Injection/Arthocentesis: L glenohumeral joint    Date/Time: 7/16/2025 2:33 PM    Performed by: Danyel Peters DO  Authorized by: Danyel Peters DO    Indications:  Pain  Needle Size:  22 G  Guidance: ultrasound    Approach:  Posterior  Location:   Shoulder      Site:  L glenohumeral joint  Medications:  40 mg methylPREDNISolone 40 MG/ML; 4 mL lidocaine 1 %  Outcome:  Tolerated well, no immediate complications  Procedure discussed: discussed risks, benefits, and alternatives    Consent Given by:  Patient  Timeout: timeout called immediately prior to procedure    Prep: patient was prepped and draped in usual sterile fashion     Ultrasound was used to ensure safe and accurate needle placement and injection. Ultrasound images of the procedure were permanently stored.   Large Joint Injection/Arthocentesis: R subacromial bursa    Date/Time: 7/16/2025 2:33 PM    Performed by: Danyel Peters DO  Authorized by: Danyel Peters DO    Indications:  Pain  Needle Size:  25 G  Guidance: ultrasound    Approach:  Posterior  Location:  Shoulder      Site:  R subacromial bursa  Medications:  40 mg triamcinolone 40 MG/ML; 4 mL lidocaine 1 %  Outcome:  Tolerated well, no immediate complications  Procedure discussed: discussed risks, benefits, and alternatives    Consent Given by:  Patient  Timeout: timeout called immediately prior to procedure    Prep: patient was prepped and draped in usual sterile fashion     Ultrasound was used to ensure safe and accurate needle placement and injection. Ultrasound images of the procedure were permanently stored.

## 2025-07-16 NOTE — LETTER
7/16/2025      LuisF Gipson  [id number] [facility] [id number]      Dear Colleague,    Thank you for referring your patient, Luis F Gipson, to the [facility] [place]. Please see a copy of my visit note below.    ESTABLISHED PATIENT FOLLOW-UP:  Pain of the Left Shoulder     HISTORY OF PRESENT ILLNESS  Mr. Gipson is a pleasant 65 year old year old male who presents to clinic today for follow-up of left shoulder pain.     Date of injury/onset: Chronic  Date last seen: 1//6/2024  Following Therapeutic Plan: Cortisone Injection - Left  Pain: Return - 8 months post injection  Function: Continues to work, dealing with pain  Interval History: Patient previously had a left shoulder/GH CS Injection and now is experiencing pain in both shoulders. His pain is similar to previously on the left side but the right side appears to be more musc     Additional medical/Social/Surgical histories reviewed in [facility] and updated as appropriate.    REVIEW OF SYSTEMS (7/16/2025)  CONSTITUTIONAL: Denies fever and weight loss  GASTROINTESTINAL: Denies abdominal pain, nausea, vomiting  MUSCULOSKELETAL: See HPI  SKIN: Denies any recent rash or lesion  NEUROLOGICAL: Denies numbness or focal weakness     PHYSICAL EXAM  There were no vitals taken for this visit.    General  - normal appearance, in no obvious distress  Musculoskeletal - right shoulder  - inspection: normal bone and joint alignment, no obvious deformity, no scapular winging, no AC step-off  - palpation: mildly tender RC insertion, normal clavicle, non-tender AC  - ROM:  painful and limited flexion and ER at end range, limited IR  - strength: 5/5  strength, 5/5 in all shoulder planes  - special tests:  (-) Speed's  (+) Neer  (+) Hawkin's  (+) Layla's  (-) [place]'s  (-) apprehension  (-) subscap lift-off  Neuro  - no sensory or motor deficit, grossly normal coordination, normal muscle tone  Skin  - no ecchymosis, erythema,  warmth, or induration, no obvious rash  Psych  - interactive, appropriate, normal mood and affect       IMAGING : Left shoulder xray 4 views. Final results and radiologist's interpretation, available in the [facility] health record. Images were reviewed with the patient/family members in the office today. My personal interpretation of the performed imaging is severe osteoarthritic changes of glenohumeral joint of the left shoulder.     ASSESSMENT & PLAN  Mr. Gipson is a 65 year old year old male who presents to clinic today for follow-up regarding recurring left shoulder pain secondary to severe glenohumeral change.  Additionally he notes increased lateral sided right shoulder pain as he is favoring his contralateral shoulder.      Clinical examination of his right shoulder most consistent with rotator cuff tendinitis.      Diagnosis:   Rotator cuff tendinopathy right shoulder  Glenohumeral osteoarthritis left shoulder    -Repeat glenohumeral injection for left shoulder as last CSI provided 7-8 months of relief. Future consideration for TSA and wishes to avoid at this time.  -Subacromial injection discussed in addition to activity modification, HEP, and future option for PT. He wishes to proceed with injection for right shoulder  -Follow up PRN if left shoulder pain recurs in 6-9 months or if incomplete relief for right shoulder. Will pursue imaging right shoulder if incomplete relief.    Right Subacromial Bursa - Ultrasound Guided  The patient was informed of the risks and the benefits of the procedure and a written consent was signed.  The patient s right shoulder was prepped with chlorhexidine in sterile fashion.   40 mg of triamcinolone suspension was drawn up into a 5 mL syringe with 4 mL of 1% lidocaine.  Injection was performed using sterile technique.  Under ultrasound guidance a 1.5-inch 22-gauge needle was used to enter the subacromial bursa.  Anterolateral approach was used with arm held in Crass position.   Needle placement was visualized and documented with ultrasound.  Ultrasound visualization was necessary to ensure placement in to the bursa and not the rotator cuff tendon which could potentially cause further tendon damage.  Injection performed long axis to the probe.  Injection solution visualized within the bursal space.  Images were permanently stored for the patient's record.  There were no complications. The patient tolerated the procedure well. There was negligible bleeding. Handout provided detailing post-injection instructions.  Glenohumeral Injection - Ultrasound Guided  The patient was informed of the risks and the benefits of the procedure and a written consent was signed.  The patient s right shoulder was prepped with chlorhexidine in sterile fashion.   Local anesthesia was performed using a 27-gauge 1.5-inch needle to administer 3 mL of 1% lidocaine without epi.  40 mg of triamcinolone suspension was drawn up into a 5 mL syringe with 3 mL of 1% lidocaine w/o Epi.  Injection was performed using sterile technique.  Under ultrasound guidance a 3.5-inch 22-gauge needle was used to enter the right glenohumeral joint.  Posterior approach was used with the patient in lateral recumbent position, arm in neutral position at the side.  Needle placement was visualized and documented with ultrasound.  Ultrasound visualization was necessary due to the small joint space entered.  Injection performed long axis to the probe.  Injection solution visualized within the joint space.  Images were permanently stored for the patient's record.  There were no complications. The patient tolerated the procedure well. There was negligible bleeding.   Therapy scheduled to follow for mobilization.  The patient was instructed to call or go to the emergency room with any unusual pain, swelling, redness, or if otherwise concerned.  Danyel Peters DO CAM  Primary Care Sports Medicine  [facility]      Large  Joint Injection/Arthocentesis: L glenohumeral joint    Date/Time: 7/16/2025 2:33 PM    Performed by: Danyel Peters DO  Authorized by: Danyel Peters DO    Indications:  Pain  Needle Size:  22 G  Guidance: ultrasound    Approach:  Posterior  Location:  Shoulder      Site:  L glenohumeral joint  Medications:  40 mg methylPREDNISolone 40 MG/ML; 4 mL lidocaine 1 %  Outcome:  Tolerated well, no immediate complications  Procedure discussed: discussed risks, benefits, and alternatives    Consent Given by:  Patient  Timeout: timeout called immediately prior to procedure    Prep: patient was prepped and draped in usual sterile fashion     Ultrasound was used to ensure safe and accurate needle placement and injection. Ultrasound images of the procedure were permanently stored.   Large Joint Injection/Arthocentesis: R subacromial bursa    Date/Time: 7/16/2025 2:33 PM    Performed by: Danyel Peters DO  Authorized by: Danyel Peters DO    Indications:  Pain  Needle Size:  25 G  Guidance: ultrasound    Approach:  Posterior  Location:  Shoulder      Site:  R subacromial bursa  Medications:  40 mg triamcinolone 40 MG/ML; 4 mL lidocaine 1 %  Outcome:  Tolerated well, no immediate complications  Procedure discussed: discussed risks, benefits, and alternatives    Consent Given by:  Patient  Timeout: timeout called immediately prior to procedure    Prep: patient was prepped and draped in usual sterile fashion     Ultrasound was used to ensure safe and accurate needle placement and injection. Ultrasound images of the procedure were permanently stored.          Again, thank you for allowing me to participate in the care of your patient.        Sincerely,        Danyel Peters DO    Electronically signed

## 2025-07-21 ENCOUNTER — RESULTS FOLLOW-UP (OUTPATIENT)
Dept: INTERNAL MEDICINE | Facility: CLINIC | Age: 65
End: 2025-07-21
Payer: COMMERCIAL

## 2025-07-23 NOTE — TELEPHONE ENCOUNTER
Spoke with pt by phone  7/22/25  Reviewed CT. Has hx some prominent vessels in abdomen dating back 2011 when had higher ETOH intake. Less now. No acute change.  Liver cyst old. Lung nodule stable and ascending thoracic aorta size stable. Repeat imaging in 1 year